# Patient Record
Sex: MALE | Race: WHITE | Employment: OTHER | URBAN - METROPOLITAN AREA
[De-identification: names, ages, dates, MRNs, and addresses within clinical notes are randomized per-mention and may not be internally consistent; named-entity substitution may affect disease eponyms.]

---

## 2019-03-13 ENCOUNTER — HOSPITAL ENCOUNTER (OUTPATIENT)
Dept: SLEEP CENTER | Age: 69
Discharge: HOME | End: 2019-03-13
Payer: MEDICARE

## 2019-03-13 DIAGNOSIS — G47.33 OSA (OBSTRUCTIVE SLEEP APNEA): Primary | ICD-10-CM

## 2019-04-15 ENCOUNTER — HOSPITAL ENCOUNTER (OUTPATIENT)
Dept: SLEEP CENTER | Age: 69
Discharge: HOME | End: 2019-04-15
Payer: MEDICARE

## 2019-04-15 PROCEDURE — 95806 SLEEP STUDY UNATT&RESP EFFT: CPT

## 2019-04-19 PROCEDURE — 95806 SLEEP STUDY UNATT&RESP EFFT: CPT | Performed by: INTERNAL MEDICINE

## 2021-06-25 ENCOUNTER — HOSPITAL ENCOUNTER (EMERGENCY)
Age: 71
Discharge: HOME | End: 2021-06-25
Payer: MEDICARE

## 2021-06-25 ENCOUNTER — APPOINTMENT (OUTPATIENT)
Dept: GENERAL RADIOLOGY | Age: 71
End: 2021-06-25
Payer: MEDICARE

## 2021-06-25 VITALS
RESPIRATION RATE: 20 BRPM | BODY MASS INDEX: 35.34 KG/M2 | SYSTOLIC BLOOD PRESSURE: 134 MMHG | DIASTOLIC BLOOD PRESSURE: 87 MMHG | HEART RATE: 67 BPM | TEMPERATURE: 97.1 F | WEIGHT: 180 LBS | OXYGEN SATURATION: 98 % | HEIGHT: 60 IN

## 2021-06-25 DIAGNOSIS — M54.32 SCIATICA OF LEFT SIDE: Primary | ICD-10-CM

## 2021-06-25 DIAGNOSIS — M25.552 LEFT HIP PAIN: ICD-10-CM

## 2021-06-25 PROCEDURE — 73502 X-RAY EXAM HIP UNI 2-3 VIEWS: CPT

## 2021-06-25 PROCEDURE — 99211 OFF/OP EST MAY X REQ PHY/QHP: CPT

## 2021-06-25 ASSESSMENT — PAIN SCALES - GENERAL: PAINLEVEL_OUTOF10: 10

## 2021-06-25 ASSESSMENT — PAIN DESCRIPTION - PAIN TYPE: TYPE: ACUTE PAIN

## 2021-06-25 ASSESSMENT — PAIN DESCRIPTION - ORIENTATION: ORIENTATION: LEFT

## 2021-06-25 ASSESSMENT — PAIN DESCRIPTION - LOCATION: LOCATION: HIP

## 2021-06-25 NOTE — ED PROVIDER NOTES
Department of Emergency Medicine   [facility]  Provider Note  Admit Date/RoomTime: 2021  6:24 PM  Room:   NAME: Lu Manzanares  : 1950  MRN: [id number]     Chief Complaint:  Hip Pain (has had left hip pain  for   some time  pain got worse  today     has had hip replacement   )    History of Present Illness       Lu Manzanares is a 73 y.o. old male who has a past medical history of:   Past Medical History:   Diagnosis Date    Hearing loss     slight    History of duodenal ulcer age 23    Lactose intolerance     Metabolic bone disease     Dr Gillian Garcia yearly; pt states sees Dr Gillian Garcia for osteopenia    Nausea & vomiting     Osteoarthritis     Osteoporosis     Preoperative clearance 3/2014    medical-Dr. Gus García; paper copy on chart    Sleep apnea     does not use cpap    Thyroid disease     Wears glasses     presents to the urgent care center by private vehicle, for evaluation of left hip pain. He said he has had problems with joint pain for years has had a hip replacement in the left he said he has had problems with pain in the left hip before I said today was sitting on a stool in his garage working on a Rototiller and he said when he went to get up he had sudden severe excruciating pain in the left hip radiating down to the knee. He denies any back pain. But states he has had problems with his back in the past also. He said this happened about 3 hours ago and since he has been waiting to be seen he said actually the pain is almost gone but he still concerned there could be a problem. ROS    Pertinent positives and negatives are stated within HPI, all other systems reviewed and are negative. Past Surgical History:   Procedure Laterality Date    ENDOSCOPY, COLON, DIAGNOSTIC      HIP ARTHROPLASTY Left 2006    [facility]    KNEE ARTHROPLASTY Bilateral 2014    Bilateral TKA  JOHNNIE Coelho, [facility] Left     Left knee meniscectomy [facility]    ROTATOR CUFF REPAIR     Social History:  reports that he has never smoked. He has never used smokeless tobacco. He reports current alcohol use. He reports that he does not use drugs. Family History: family history is not on file. Allergies: Other, Darvocet a500 [propoxyphene n-acetaminophen], and Percocet [oxycodone-acetaminophen]    Physical Exam           ED Triage Vitals [06/25/21 1826]   BP Temp Temp Source Pulse Resp SpO2 Height Weight   134/87 97.1 °F (36.2 °C) Infrared 67 20 98 % 5' (1.524 m) 180 lb (81.6 kg)      Oxygen Saturation Interpretation: Normal.    · Constitutional:  Alert, development consistent with age. · HEENT:  NC/NT. Airway patent. · Neck:  Normal ROM. Supple. · Extremity(s):  Left: hip. No edema noted in the leg he can ambulate without difficulty he can get up and down off the exam table without difficulty. Full range of motion of the knee without problems. ·             Gait:  normal.  · Lymphatics: No lymphangitis or adenopathy noted. · Neurological:  Oriented x3. Motor functions intact. Lab / Imaging Results   (All laboratory and radiology results have been personally reviewed by myself)  Labs:  No results found for this visit on 06/25/21. Imaging: All Radiology results interpreted by Radiologist unless otherwise noted. XR HIP LEFT (2-3 VIEWS)   Final Result   No evidence of acute fracture or dislocation of the left hip. Left hip prosthesis in anatomic orientation. ED Course / Medical Decision Making   Medications - No data to display     Consults:   None      MDM:      X-ray of the hip was obtained and it was negative. The left hip prosthesis is in anatomic alignment there is no fracture or dislocation.   Discussed the result with him I did advise him that I can order him something for pain and inflammation this could be a sciatica problem since he has had that  in the past ---patient states he does not want to wait any

## 2023-02-01 ENCOUNTER — APPOINTMENT (OUTPATIENT)
Dept: GENERAL RADIOLOGY | Age: 73
End: 2023-02-01
Payer: MEDICARE

## 2023-02-01 ENCOUNTER — HOSPITAL ENCOUNTER (OUTPATIENT)
Age: 73
Setting detail: OBSERVATION
Discharge: HOME | End: 2023-02-03
Attending: EMERGENCY MEDICINE | Admitting: INTERNAL MEDICINE
Payer: MEDICARE

## 2023-02-01 DIAGNOSIS — I49.8 BIGEMINY: ICD-10-CM

## 2023-02-01 DIAGNOSIS — I49.9 VENTRICULAR ARRHYTHMIA: ICD-10-CM

## 2023-02-01 DIAGNOSIS — R07.9 CHEST PAIN, UNSPECIFIED TYPE: Primary | ICD-10-CM

## 2023-02-01 LAB
ALBUMIN SERPL-MCNC: 3.9 G/DL (ref 3.5–5.2)
ALP BLD-CCNC: 61 U/L (ref 40–129)
ALT SERPL-CCNC: 19 U/L (ref 0–40)
ANION GAP SERPL CALCULATED.3IONS-SCNC: 11 MMOL/L (ref 7–16)
AST SERPL-CCNC: 20 U/L (ref 0–39)
BASOPHILS ABSOLUTE: 0.05 E9/L (ref 0–0.2)
BASOPHILS RELATIVE PERCENT: 0.9 % (ref 0–2)
BILIRUB SERPL-MCNC: 0.4 MG/DL (ref 0–1.2)
BUN BLDV-MCNC: 29 MG/DL (ref 6–23)
CALCIUM SERPL-MCNC: 8.9 MG/DL (ref 8.6–10.2)
CHLORIDE BLD-SCNC: 104 MMOL/L (ref 98–107)
CO2: 27 MMOL/L (ref 22–29)
CREAT SERPL-MCNC: 1 MG/DL (ref 0.7–1.2)
D DIMER: 200 NG/ML DDU
EOSINOPHILS ABSOLUTE: 0.12 E9/L (ref 0.05–0.5)
EOSINOPHILS RELATIVE PERCENT: 2.1 % (ref 0–6)
GFR SERPL CREATININE-BSD FRML MDRD: >60 ML/MIN/1.73
GLUCOSE BLD-MCNC: 130 MG/DL (ref 74–99)
HCT VFR BLD CALC: 46.4 % (ref 37–54)
HEMOGLOBIN: 16 G/DL (ref 12.5–16.5)
IMMATURE GRANULOCYTES #: 0.02 E9/L
IMMATURE GRANULOCYTES %: 0.4 % (ref 0–5)
LYMPHOCYTES ABSOLUTE: 1.28 E9/L (ref 1.5–4)
LYMPHOCYTES RELATIVE PERCENT: 22.4 % (ref 20–42)
MAGNESIUM: 2 MG/DL (ref 1.6–2.6)
MCH RBC QN AUTO: 30.7 PG (ref 26–35)
MCHC RBC AUTO-ENTMCNC: 34.5 % (ref 32–34.5)
MCV RBC AUTO: 89.1 FL (ref 80–99.9)
MONOCYTES ABSOLUTE: 0.49 E9/L (ref 0.1–0.95)
MONOCYTES RELATIVE PERCENT: 8.6 % (ref 2–12)
NEUTROPHILS ABSOLUTE: 3.75 E9/L (ref 1.8–7.3)
NEUTROPHILS RELATIVE PERCENT: 65.6 % (ref 43–80)
PDW BLD-RTO: 12.9 FL (ref 11.5–15)
PLATELET # BLD: 146 E9/L (ref 130–450)
PMV BLD AUTO: 10.9 FL (ref 7–12)
POTASSIUM SERPL-SCNC: 3.9 MMOL/L (ref 3.5–5)
RBC # BLD: 5.21 E12/L (ref 3.8–5.8)
SODIUM BLD-SCNC: 142 MMOL/L (ref 132–146)
TOTAL PROTEIN: 6.1 G/DL (ref 6.4–8.3)
TROPONIN, HIGH SENSITIVITY: 16 NG/L (ref 0–11)
TROPONIN, HIGH SENSITIVITY: 16 NG/L (ref 0–11)
WBC # BLD: 5.7 E9/L (ref 4.5–11.5)

## 2023-02-01 PROCEDURE — 99285 EMERGENCY DEPT VISIT HI MDM: CPT

## 2023-02-01 PROCEDURE — 85378 FIBRIN DEGRADE SEMIQUANT: CPT

## 2023-02-01 PROCEDURE — 36415 COLL VENOUS BLD VENIPUNCTURE: CPT

## 2023-02-01 PROCEDURE — 71045 X-RAY EXAM CHEST 1 VIEW: CPT

## 2023-02-01 PROCEDURE — 6370000000 HC RX 637 (ALT 250 FOR IP): Performed by: EMERGENCY MEDICINE

## 2023-02-01 PROCEDURE — 84484 ASSAY OF TROPONIN QUANT: CPT

## 2023-02-01 PROCEDURE — 93005 ELECTROCARDIOGRAM TRACING: CPT | Performed by: EMERGENCY MEDICINE

## 2023-02-01 PROCEDURE — 85025 COMPLETE CBC W/AUTO DIFF WBC: CPT

## 2023-02-01 PROCEDURE — 80053 COMPREHEN METABOLIC PANEL: CPT

## 2023-02-01 PROCEDURE — 83735 ASSAY OF MAGNESIUM: CPT

## 2023-02-01 RX ORDER — TAMSULOSIN HYDROCHLORIDE 0.4 MG/1
0.4 CAPSULE ORAL DAILY
COMMUNITY

## 2023-02-01 RX ORDER — ASPIRIN 81 MG/1
324 TABLET, CHEWABLE ORAL ONCE
Status: COMPLETED | OUTPATIENT
Start: 2023-02-01 | End: 2023-02-01

## 2023-02-01 RX ORDER — NITROGLYCERIN 0.4 MG/1
0.4 TABLET SUBLINGUAL EVERY 5 MIN PRN
Status: DISCONTINUED | OUTPATIENT
Start: 2023-02-01 | End: 2023-02-03 | Stop reason: HOSPADM

## 2023-02-01 RX ADMIN — ASPIRIN 81 MG CHEWABLE TABLET 324 MG: 81 TABLET CHEWABLE at 17:49

## 2023-02-01 RX ADMIN — NITROGLYCERIN 0.4 MG: 0.4 TABLET, ORALLY DISINTEGRATING SUBLINGUAL at 17:48

## 2023-02-01 ASSESSMENT — PAIN SCALES - GENERAL
PAINLEVEL_OUTOF10: 5
PAINLEVEL_OUTOF10: 3

## 2023-02-01 ASSESSMENT — PAIN DESCRIPTION - LOCATION
LOCATION: CHEST
LOCATION: CHEST;BACK

## 2023-02-01 ASSESSMENT — PAIN DESCRIPTION - DESCRIPTORS
DESCRIPTORS: ACHING;SORE
DESCRIPTORS: ACHING

## 2023-02-01 ASSESSMENT — PAIN - FUNCTIONAL ASSESSMENT: PAIN_FUNCTIONAL_ASSESSMENT: 0-10

## 2023-02-01 ASSESSMENT — PAIN DESCRIPTION - PAIN TYPE: TYPE: ACUTE PAIN

## 2023-02-01 NOTE — ED PROVIDER NOTES
[facility]        Pt Name: Kenisha Fulton  MRN: [id number]  [place] 1950  Date of evaluation: 2/1/2023  Provider: Gilbert Polk DO  PCP: Barb Hernandez DO  Note Started: 5:09 PM EST 2/1/23    CHIEF COMPLAINT       Chief Complaint   Patient presents with    Chest Pain     Pt with mid and left chest pain with simultaneous pain in back after returning home from walking two 15 minute miles this morning       HISTORY OF PRESENT ILLNESS: 1 or more Elements   History From: patient     Limitations to history : None    Kenisha Fulton is a 81 y.o. male who presents with substernal chest pain radiating to his left shoulder and left upper back beginning around 12noon today after walking 2 miles (daily routine exercise). Describes it as an ache and has been constant since 12:00. He has a past medical history significant for some kind of cardiac arrhythmia and had a normal stress test in August.  He also has a past medical history of DVT on Eliquis once a day. He states he has a history of diabetes that is controlled with diet and exercise and his last hemoglobin A1c was in the 6s. The complaint has been constant, moderate in severity, and worsened by nothing. Patient denies fever/chills, sore throat, cough, congestion,  shortness of breath, edema, headache, visual disturbances, focal paresthesias, focal weakness, abdominal pain, nausea, vomiting, diarrhea, constipation, dysuria, hematuria, trauma, neck or back pain, rash or other complaints. Nursing Notes were all reviewed and agreed with or any disagreements were addressed in the HPI. REVIEW OF SYSTEMS :           Positives and Pertinent negatives as per HPI.      SURGICAL HISTORY     Past Surgical History:   Procedure Laterality Date    ENDOSCOPY, COLON, DIAGNOSTIC      HIP ARTHROPLASTY Left 01/2006    [facility]    KNEE ARTHROPLASTY Bilateral 05/12/2014    Bilateral TKA  JOHNNIE Loyd MD    KNEE SURGERY Left 1986    Left knee meniscectomy [facility]    ROTATOR CUFF REPAIR         CURRENTMEDICATIONS       Previous Medications    ACETAMINOPHEN 650 MG TABS    Take 650 mg by mouth every 4 hours as needed. B COMPLEX VITAMINS CAPSULE    Take 1 capsule by mouth daily. CALCIUM CARBONATE (TUMS) 500 MG CHEWABLE TABLET    Take 1 tablet by mouth daily    LACTASE (LACTAID PO)    Take  by mouth as needed. MELOXICAM (MOBIC) 15 MG TABLET    Take 15 mg by mouth Daily with supper. METFORMIN HCL PO    Take 500 mg by mouth daily    RIVAROXABAN (XARELTO PO)    Take 20 mg by mouth daily    TAMSULOSIN (FLOMAX) 0.4 MG CAPSULE    Take 0.4 mg by mouth daily    THYROID (ARMOUR) 30 MG TABLET    Take 30 mg by mouth daily. VITAMIN B-12 (CYANOCOBALAMIN) 100 MCG TABLET    Take 50 mcg by mouth daily    VITAMIN D (CHOLECALCIFEROL) 1000 UNIT TABS TABLET    Take 2,000 Units by mouth daily       ALLERGIES     Other, Darvocet a500 [propoxyphene n-acetaminophen], and Percocet [oxycodone-acetaminophen]    FAMILYHISTORY     History reviewed. No pertinent family history.      SOCIAL HISTORY       Social History     Tobacco Use    Smoking status: Never    Smokeless tobacco: Never   Substance Use Topics    Alcohol use: Yes     Comment: wine 1-2 per week    Drug use: No       SCREENINGS        Maurice Coma Scale  Eye Opening: Spontaneous  Best Verbal Response: Oriented  Best Motor Response: Obeys commands  [place] Coma Scale Score: 15                CIWA Assessment  BP: 134/70  Heart Rate: (!) 48           PHYSICAL EXAM  1 or more Elements     ED Triage Vitals   BP Temp Temp Source Heart Rate Resp SpO2 Height Weight   02/01/23 1633 02/01/23 1633 02/01/23 1633 02/01/23 1633 02/01/23 1633 02/01/23 1633 02/01/23 1700 02/01/23 1655   134/70 97.6 °F (36.4 °C) Oral (!) 48 14 97 % 5' (1.524 m) 178 lb (80.7 kg)            ----------------------------------------PHYSICAL EXAM--------------------------------------  Constitutional:  Well developed, well nourished, no acute distress, non-toxic appearance   Eyes:  PERRL, conjunctiva normal, EOMI  HENT:  Atraumatic, external ears normal, nose normal, oropharynx moist,. Neck- normal range of motion, no nuchal rigidity   Respiratory:  No respiratory distress, normal breath sounds, no rales, no wheezing   Cardiovascular:  Normal rate, normal rhythm, no murmurs, no gallops, no rubs. Radial and DP pulses 2+ bilaterally. Compartments are soft. He is warm and well perfused. Cap refill less than 3 seconds. Chest pain is not reproducible. GI:  Soft, nondistended, normal bowel sounds, nontender, no organomegaly, no mass, no rebound, no guarding   :  No costovertebral angle tenderness   Musculoskeletal:  No edema, no tenderness, no deformities. Back- no tenderness  Integument:  Well hydrated, no rash. Adequate perfusion. Lymphatic:  No cervical lymphadenopathy noted   Neurologic:  Alert & oriented x 3, CN 2-12 normal, no focal deficits noted. . Normal gait.     Psychiatric:  Speech and behavior appropriate             DIAGNOSTIC RESULTS   LABS:  Results for orders placed or performed during the hospital encounter of 02/01/23   CBC with Auto Differential   Result Value Ref Range    WBC 5.7 4.5 - 11.5 E9/L    RBC 5.21 3.80 - 5.80 E12/L    Hemoglobin 16.0 12.5 - 16.5 g/dL    Hematocrit 46.4 37.0 - 54.0 %    MCV 89.1 80.0 - 99.9 fL    MCH 30.7 26.0 - 35.0 pg    MCHC 34.5 32.0 - 34.5 %    RDW 12.9 11.5 - 15.0 fL    Platelets 078 257 - 023 E9/L    MPV 10.9 7.0 - 12.0 fL    Neutrophils % 65.6 43.0 - 80.0 %    Immature Granulocytes % 0.4 0.0 - 5.0 %    Lymphocytes % 22.4 20.0 - 42.0 %    Monocytes % 8.6 2.0 - 12.0 %    Eosinophils % 2.1 0.0 - 6.0 %    Basophils % 0.9 0.0 - 2.0 %    Neutrophils Absolute 3.75 1.80 - 7.30 E9/L    Immature Granulocytes # 0.02 E9/L    Lymphocytes Absolute 1.28 (L) 1.50 - 4.00 E9/L    Monocytes Absolute 0.49 0.10 - 0.95 E9/L    Eosinophils Absolute 0.12 0.05 - 0.50 E9/L    Basophils Absolute 0.05 0.00 - 0.20 E9/L   Troponin   Result Value Ref Range    Troponin, High Sensitivity 16 (H) 0 - 11 ng/L   D-Dimer, Quantitative   Result Value Ref Range    D-Dimer, Quant 200 ng/mL DDU       As interpreted by me, the above displayed labs are abnormal. All other labs obtained during this visit were within normal range or not returned as of this dictation. EKG Interpretation  Interpreted by emergency department physicianBrian,   Time: 16:44 PM EDT  Rhythm:  sinsu rhythm with frequent PVCs in a pattern of bigeminy  Rate: 92  Axis: normal  Conduction: normal  ST Segments: no acute change  T Waves: no acute change  Clinical Impression: bigeminy  Comparison to prior EKG: no previous EKG        RADIOLOGY:   Non-plain film images such as CT, Ultrasound and MRI are read by the radiologist. Plain radiographic images are visualized and preliminarily interpreted by the ED Provider with the below findings:    CXR 1 view: clear lungs    Interpretation per the Radiologist below, if available at the time of this note:    XR CHEST PORTABLE    (Results Pending)     No results found. No results found. PROCEDURES   Unless otherwise noted below, none          CRITICAL CARE TIME (.cct)       PAST MEDICAL HISTORY/Chronic Conditions Affecting Care      has a past medical history of Hearing loss, History of duodenal ulcer (age 23), Lactose intolerance, Metabolic bone disease, Nausea & vomiting, Osteoarthritis, Osteoporosis, Preoperative clearance (3/2014), Sleep apnea, Thyroid disease, and Wears glasses.      EMERGENCY DEPARTMENT COURSE    Vitals:    Vitals:    02/01/23 1633 02/01/23 1655 02/01/23 1700   BP: 134/70     Pulse: (!) 48     Resp: 14     Temp: 97.6 °F (36.4 °C)     TempSrc: Oral     SpO2: 97%     Weight:  178 lb (80.7 kg) 178 lb (80.7 kg)   Height:   5' (1.524 m)       Patient was given the following medications:  Medications nitroGLYCERIN (NITROSTAT) SL tablet 0.4 mg (0.4 mg SubLINGual Given 2/1/23 1748)   aspirin chewable tablet 324 mg (324 mg Oral Given 2/1/23 1749)         Medical Decision Making/Differential Diagnosis:    CC/HPI Summary, Social Determinants of health, Records Reviewed, DDx, testing done/not done, ED Course, Reassessment, disposition considerations/shared decision making with patient, consults, disposition:            MDM:   Patient presents with left-sided substernal chest pain described as aching, radiating to his left shoulder and left posterior upper back. It has been constant since his exercise routine today. He did not have any pain during his exercise. His EKG is consistent with bigeminy. On the cardiac monitor he goes in and out of bigeminy versus normal sinus rhythm. He still has pain upon presentation to the ER so he was provided aspirin and nitroglycerin sublingual.  At this time patient care is turned over to Dr. Russ Linn pending laboratory work, possible CTA to rule out PE and admission to [facility] for cardiac stress test.  Differential diagnosis includes: Neck ischemia, NSTEMI, STEMI, PE   --------------------------------- IMPRESSION AND DISPOSITION ---------------------------------    IMPRESSION  1. Chest pain, unspecified type    2. Bigeminy        DISPOSITION  Disposition: Admit to telemetry  Patient condition is stable    PATIENT REFERRED TO:  No follow-up provider specified. DISCHARGE MEDICATIONS:  New Prescriptions    No medications on file       DISCONTINUED MEDICATIONS:  Discontinued Medications    ASCORBIC ACID (VITAMIN C) 250 MG TABLET    Take 250 mg by mouth daily    ASPIRIN 81 MG TABLET    Take 81 mg by mouth daily. Last dose 05/02. BISACODYL (DULCOLAX) 10 MG SUPPOSITORY    Place 1 suppository rectally daily as needed for Constipation. DOCUSATE SODIUM (COLACE, DULCOLAX) 100 MG CAPS    Take 100 mg by mouth daily.     MULTIPLE VITAMINS-MINERALS (MULTIVITAMIN PO)    Take by mouth daily. OMEGA-3 FATTY ACIDS (FISH OIL) 1000 MG CPDR    Take  by mouth daily. Last dose 05/02. PSYLLIUM (METAMUCIL PO)    Take 1 capsule by mouth daily.     RED YEAST RICE 600 MG CAPS    Take by mouth    VITAMIN E 1000 UNITS CAPSULE    Take 1,000 Units by mouth daily    ZINC 100 MG TABS    Take by mouth              (Please note that portions of this note were completed with a voice recognition program.  Efforts were made to edit the dictations but occasionally words are mis-transcribed.)    Cornelius Saucedo DO (electronically signed)            Cornelius Saucedo DO  02/01/23 [id number]

## 2023-02-02 PROBLEM — R07.9 CHEST PAIN: Status: ACTIVE | Noted: 2023-02-02

## 2023-02-02 PROBLEM — I49.9 VENTRICULAR ARRHYTHMIA: Status: ACTIVE | Noted: 2023-02-02

## 2023-02-02 LAB
EKG ATRIAL RATE: 92 BPM
EKG P AXIS: 43 DEGREES
EKG P-R INTERVAL: 148 MS
EKG Q-T INTERVAL: 348 MS
EKG QRS DURATION: 72 MS
EKG QTC CALCULATION (BAZETT): 430 MS
EKG R AXIS: 23 DEGREES
EKG T AXIS: 37 DEGREES
EKG VENTRICULAR RATE: 92 BPM
LV EF: 70 %
LVEF MODALITY: NORMAL
TROPONIN, HIGH SENSITIVITY: 20 NG/L (ref 0–11)

## 2023-02-02 PROCEDURE — 6370000000 HC RX 637 (ALT 250 FOR IP): Performed by: STUDENT IN AN ORGANIZED HEALTH CARE EDUCATION/TRAINING PROGRAM

## 2023-02-02 PROCEDURE — APPSS60 APP SPLIT SHARED TIME 46-60 MINUTES

## 2023-02-02 PROCEDURE — 6370000000 HC RX 637 (ALT 250 FOR IP)

## 2023-02-02 PROCEDURE — 99253 IP/OBS CNSLTJ NEW/EST LOW 45: CPT | Performed by: INTERNAL MEDICINE

## 2023-02-02 PROCEDURE — G0378 HOSPITAL OBSERVATION PER HR: HCPCS

## 2023-02-02 PROCEDURE — 84484 ASSAY OF TROPONIN QUANT: CPT

## 2023-02-02 PROCEDURE — 93010 ELECTROCARDIOGRAM REPORT: CPT | Performed by: INTERNAL MEDICINE

## 2023-02-02 PROCEDURE — 99221 1ST HOSP IP/OBS SF/LOW 40: CPT | Performed by: INTERNAL MEDICINE

## 2023-02-02 PROCEDURE — 36415 COLL VENOUS BLD VENIPUNCTURE: CPT

## 2023-02-02 PROCEDURE — 93306 TTE W/DOPPLER COMPLETE: CPT

## 2023-02-02 RX ORDER — METOPROLOL SUCCINATE 25 MG/1
25 TABLET, EXTENDED RELEASE ORAL 2 TIMES DAILY
Status: DISCONTINUED | OUTPATIENT
Start: 2023-02-02 | End: 2023-02-03 | Stop reason: HOSPADM

## 2023-02-02 RX ORDER — TAMSULOSIN HYDROCHLORIDE 0.4 MG/1
0.4 CAPSULE ORAL DAILY
Status: DISCONTINUED | OUTPATIENT
Start: 2023-02-02 | End: 2023-02-03 | Stop reason: HOSPADM

## 2023-02-02 RX ORDER — ASPIRIN 81 MG/1
81 TABLET, CHEWABLE ORAL DAILY
Status: DISCONTINUED | OUTPATIENT
Start: 2023-02-03 | End: 2023-02-03 | Stop reason: HOSPADM

## 2023-02-02 RX ORDER — ASPIRIN 81 MG/1
81 TABLET, CHEWABLE ORAL DAILY
Status: DISCONTINUED | OUTPATIENT
Start: 2023-02-02 | End: 2023-02-02

## 2023-02-02 RX ORDER — LEVOTHYROXINE AND LIOTHYRONINE 19; 4.5 UG/1; UG/1
30 TABLET ORAL DAILY
Status: DISCONTINUED | OUTPATIENT
Start: 2023-02-02 | End: 2023-02-03 | Stop reason: HOSPADM

## 2023-02-02 RX ORDER — METOPROLOL SUCCINATE 50 MG/1
50 TABLET, EXTENDED RELEASE ORAL ONCE
Status: COMPLETED | OUTPATIENT
Start: 2023-02-02 | End: 2023-02-02

## 2023-02-02 RX ADMIN — METOPROLOL SUCCINATE 50 MG: 50 TABLET, EXTENDED RELEASE ORAL at 14:00

## 2023-02-02 RX ADMIN — LEVOTHYROXINE, LIOTHYRONINE 30 MG: 19; 4.5 TABLET ORAL at 10:30

## 2023-02-02 RX ADMIN — METOPROLOL SUCCINATE 25 MG: 25 TABLET, EXTENDED RELEASE ORAL at 20:50

## 2023-02-02 RX ADMIN — RIVAROXABAN 20 MG: 20 TABLET, FILM COATED ORAL at 17:53

## 2023-02-02 RX ADMIN — TAMSULOSIN HYDROCHLORIDE 0.4 MG: 0.4 CAPSULE ORAL at 10:31

## 2023-02-02 RX ADMIN — METFORMIN HYDROCHLORIDE 500 MG: 500 TABLET ORAL at 10:30

## 2023-02-02 ASSESSMENT — PAIN SCALES - GENERAL
PAINLEVEL_OUTOF10: 0

## 2023-02-02 ASSESSMENT — LIFESTYLE VARIABLES: HOW OFTEN DO YOU HAVE A DRINK CONTAINING ALCOHOL: NEVER

## 2023-02-02 NOTE — ED NOTES
[facility] to [employer].  [facility] ambulance here now for transport     Roma Keyes RN  02/02/23 0631

## 2023-02-02 NOTE — ED PROVIDER NOTES
6:21 PM EST  I received this patient at sign out from Dr. De La Torre. I have discussed the patient's initial exam, treatment and plan of care with the out going physician. I have introduced my self to the patient / family and have answered their questions to this point. I have examined the patient myself and reviewed ordered tests / medications and  reviewed any available results to this point. See Dr. Ani Man notes RE: HPI/ROS/family history/review of systems and EKG interpretation, which I did review. --------------------------------------------- PAST HISTORY ---------------------------------------------  Past Medical History:  has a past medical history of Hearing loss, History of duodenal ulcer, Lactose intolerance, Metabolic bone disease, Nausea & vomiting, Osteoarthritis, Osteoporosis, Preoperative clearance, Sleep apnea, Thyroid disease, and Wears glasses. Past Surgical History:  has a past surgical history that includes Hip Arthroplasty (Left, 01/2006); knee surgery (Left, 1986); Endoscopy, colon, diagnostic; Knee Arthroplasty (Bilateral, 05/12/2014); and Rotator cuff repair. Social History:  reports that he has never smoked. He has never used smokeless tobacco. He reports current alcohol use. He reports that he does not use drugs. Family History: family history is not on file. The patients home medications have been reviewed. Allergies:  Other, Darvocet a500 [propoxyphene n-acetaminophen], and Percocet [oxycodone-acetaminophen]    -------------------------------------------------- RESULTS -------------------------------------------------  All laboratory and radiology results have been personally reviewed by myself   LABS:  Results for orders placed or performed during the hospital encounter of 02/01/23   CBC with Auto Differential   Result Value Ref Range    WBC 5.7 4.5 - 11.5 E9/L    RBC 5.21 3.80 - 5.80 E12/L    Hemoglobin 16.0 12.5 - 16.5 g/dL    Hematocrit 46.4 37.0 - 54.0 %    MCV 89.1 80.0 - 99.9 fL    MCH 30.7 26.0 - 35.0 pg    MCHC 34.5 32.0 - 34.5 %    RDW 12.9 11.5 - 15.0 fL    Platelets 484 180 - 489 E9/L    MPV 10.9 7.0 - 12.0 fL    Neutrophils % 65.6 43.0 - 80.0 %    Immature Granulocytes % 0.4 0.0 - 5.0 %    Lymphocytes % 22.4 20.0 - 42.0 %    Monocytes % 8.6 2.0 - 12.0 %    Eosinophils % 2.1 0.0 - 6.0 %    Basophils % 0.9 0.0 - 2.0 %    Neutrophils Absolute 3.75 1.80 - 7.30 E9/L    Immature Granulocytes # 0.02 E9/L    Lymphocytes Absolute 1.28 (L) 1.50 - 4.00 E9/L    Monocytes Absolute 0.49 0.10 - 0.95 E9/L    Eosinophils Absolute 0.12 0.05 - 0.50 E9/L    Basophils Absolute 0.05 0.00 - 0.20 E9/L   Comprehensive Metabolic Panel   Result Value Ref Range    Sodium 142 132 - 146 mmol/L    Potassium 3.9 3.5 - 5.0 mmol/L    Chloride 104 98 - 107 mmol/L    CO2 27 22 - 29 mmol/L    Anion Gap 11 7 - 16 mmol/L    Glucose 130 (H) 74 - 99 mg/dL    BUN 29 (H) 6 - 23 mg/dL    Creatinine 1.0 0.7 - 1.2 mg/dL    Est, Glom Filt Rate >60 >=60 mL/min/1.73    Calcium 8.9 8.6 - 10.2 mg/dL    Total Protein 6.1 (L) 6.4 - 8.3 g/dL    Albumin 3.9 3.5 - 5.2 g/dL    Total Bilirubin 0.4 0.0 - 1.2 mg/dL    Alkaline Phosphatase 61 40 - 129 U/L    ALT 19 0 - 40 U/L    AST 20 0 - 39 U/L   Magnesium   Result Value Ref Range    Magnesium 2.0 1.6 - 2.6 mg/dL   Troponin   Result Value Ref Range    Troponin, High Sensitivity 16 (H) 0 - 11 ng/L   D-Dimer, Quantitative   Result Value Ref Range    D-Dimer, Quant 200 ng/mL DDU   Troponin   Result Value Ref Range    Troponin, High Sensitivity 16 (H) 0 - 11 ng/L       RADIOLOGY:  Interpreted by Radiologist.  XR CHEST PORTABLE   Final Result   No acute process. ------------------------- NURSING NOTES AND VITALS REVIEWED ---------------------------      The nursing notes within the ED encounter and vital signs as below have been reviewed.    BP (!) 142/89   Pulse 84   Temp 97.6 °F (36.4 °C) (Oral)   Resp 16   Ht 5' (1.524 m)   Wt 178 lb (80.7 kg)   SpO2 98% BMI 34.76 kg/m²  Oxygen Saturation Interpretation: Normal    ---------------------------------------------------PHYSICAL EXAM--------------------------------------    Constitutional/General: Alert and oriented x3, well appearing, non toxic in NAD at the time of my exam  Head: Normocephalic and atraumatic  Eyes: PERRL, EOMI, no scleral injection, no scleral icterus  Mouth: Oropharynx clear, handling secretions, no trismus  Neck: Supple, full ROM, no JVD, trachea midline  Pulmonary: Lungs clear to auscultation bilaterally, no wheezes, rales, or rhonchi. Not in respiratory distress  Cardiovascular:  Regular rate and rhythm, no murmurs, gallops, or rubs. 2+ distal pulses  Abdomen: Soft, non tender, non distended, no organomegaly no masses no guarding no rigidity normal bowel sounds  Extremities: Moves all extremities x 4. Warm and well perfused  Skin: warm and dry without rash; no petechia no purpura no target lesions no bullae  Neurologic: GCS 15, cranial nerves II through XII intact with no focal deficits. No meningeal signs. Psych: Normal Affect    ------------------------------ ED COURSE/MEDICAL DECISION MAKING----------------------  Medications   nitroGLYCERIN (NITROSTAT) SL tablet 0.4 mg (0.4 mg SubLINGual Given 2/1/23 1748)   aspirin chewable tablet 324 mg (324 mg Oral Given 2/1/23 1749)     ED COURSE:  EKG #1:  Interpreted by emergency department attending physician unless otherwise noted. 2/1/23  Time: 16:44  Rhythm: normal sinus   Rate: 90-95  Axis: normal  Conduction: nonspecific interventricular conduction block; PVC's w/ Bigeminy  ST Segments: nonspecific changes  T Waves: non specific changes    Clinical Impression: non-specific EKG, PVC's w/ Bigeminy  Comparison to Prior tracings: There are no previous tracings available for comparison.           The HEART Risk Score for Acute Coronary Syndrome  Age <46 years, 55-63, 65+  ?  2   > 2 Risk Factors for CAD?*, 1-2, 0  2   History: slight, moderate, highly suspicious  1   EKG: Normal, nonspecific repolarization changes, ST depression   1        Total HEART Score:  6 points, moderate risk score     *Risk factors as follows:                    - Hypertension         - Diabetes mellitus (Diet Controlled)  - Obesity (BMI > 34)    Predicts 6-week risk of major adverse cardiac event. Low Score (0-3 points), risk of MACE of 0.9-1.7%. Moderate Score (4-6 points), risk of MACE of 12-16.6%. High Score (7-10 points), risk of MACE of 50-65%. Medical Decision Making:  CC/HPI:  74-year-old male presents with complaint of chest pain which occurred shortly after he walked 2 miles on a treadmill  Patient was given the following medications during their ED Course  Nitroglycerin 0.4 mg sublingual, aspirin 324 mg p.o. Chronic Conditions Affecting Care:  Diet controlled prediabetes, no history of coronary artery disease no prior MI, no hypertension   Social Determinants Influencing Care:  Non-smoker no history of alcohol abuse, no drug use  Outside Records Reviewed: n/a  Differential Diagnoses Considered/Test Results/ED COURSE:  ACS/MI, pneumonia, pneumothorax, PE, chest wall strain, GERD, to name a few. Patient has a nondiagnostic EKG but with ventricular bigeminy. The patient's HEART score = 6 points, indicating moderate risk score for ACS. Patient has had previous cardiac stress test in the [facility], per his report. Patient prefers to be admitted to [facility] and see a cardiologist there for consultation. Patient has remained hemodynamically stable here in the department. Screening chest x-ray did not show any focal infiltrates, no evidence of pneumothorax. The patient's cardiac markers show normal delta troponin. Patient's Wells score for PE = 0 points, low risk score; his serum D-dimer was within the limits. Complete metabolic profile showed glucose of 130 with a BUN of 29 creatinine 1.0 normal calculated anion gap. CBC was within the limits. Screening chest x-ray showed no focal infiltrates, no mediastinal abnormalities to suggest aortic dissection and no evidence of cardiomegaly or CHF. Consults:  Hospitalist on call, Dr. Luis Fernando Beltran, reviewed the patient's chart and accepted the patient for admission to his service. Re-Assessment:  Re-examination:  2/1/23   7:22 PM EST        Vital Signs:   Vitals:    02/01/23 2100 02/01/23 2200 02/01/23 2300 02/01/23 2315   BP: 130/85 133/87 129/82 (!) 142/89   Pulse: 70 63 90 84   Resp: 16 18 16 16   Temp:       TempSrc:       SpO2: 98% 99% 98% 98%   Weight:       Height:         Card/Pulm:  Rhythm: normal rate. Heart Sounds: no murmurs, gallops, or rubs. clear to auscultation, no wheezes or rales, and unlabored breathing. Capillary Refill: normal.  Radial Pulse:  present 2+. Skin:  Dry. ADMIT vs D/C/ Shared Decision Making:  Admission to the hospital was felt prudent given the patient's HEART score and the patient's history of postexertional chest pain, it is felt prudent to admit the patient to the hospital.  Patient agrees with this plan of care. Counseling: The emergency provider has spoken with the patient and discussed todays results, in addition to providing specific details for the plan of care and counseling regarding the diagnosis and prognosis. Questions are answered at this time and they are agreeable with the plan.    --------------------------------- IMPRESSION AND DISPOSITION ---------------------------------    IMPRESSION  1. Chest pain, unspecified type    2. Bigeminy        DISPOSITION  Disposition: Admit to telemetry  Patient condition is stable      NOTE: This report was transcribed using voice recognition software.  Every effort was made to ensure accuracy; however, inadvertent computerized transcription errors may be present       Ann Hernandez MD  02/01/23 5697

## 2023-02-02 NOTE — ED NOTES
Patient was accepted at [facility] by Dr. Elvin Donohue (intermediate tele). Patient will be placed on bed board.      [place], [place]  84/02/23 5113

## 2023-02-02 NOTE — CARE COORDINATION
2/2, Patient admitted for substernal chest pain. SW went to meet with patient. Unable to meet with patient due to test being done on patient. Per previous admission patient lives in a ranch home with wife. DME owned is [employer] and an elevated commode. PCP is Dr. Lisa Borges and Pharmacy is [facility] in [place]. Patient has a hx with [place]. SW/CM will follow to see what further recommendations are made on patient.       ALEX Gao  [facility] Case Management  [phone] Attempted calling patient, unable to leave message.

## 2023-02-02 NOTE — CONSULTS
Inpatient Cardiology Consultation      Reason for Consult: Chest pain    Consulting Physician: Dr. Rene Jeffrey    Requesting Physician:  Ed Momin MD    Date of Consultation: 2/2/2023    HISTORY OF PRESENT ILLNESS:   Patient is a 74-year-old male who is not known to [facility] cardiology. Patient follows with Dr. David Beckman in [facility] for cardiology. PMHx: Hypertension, hypothyroidism, YENY with CPAP, DVT on Xarelto once daily, diabetes no medications    Normal stress August/2022? HPI:  Patient presented to [facility] ER 2/1/2023 at 4:32 PM for chest pain occurring shortly after he walked 2 miles on a treadmill. Chest pain is substernal with radiation to left shoulder and left upper back scribed as an ache that has been constant since noon prior to arrival.  EKG was consistent with bigeminy. Patient presented with pain to ER and was provided with aspirin and sublingual nitroglycerin. Patient with a heart score of 6. Patient transferred from [facility] to [facility] for chest pain with bigeminy. VS upon arrival 97.6, 14 respirations, 48 pulse, 134/70, 97% room air. Labs: Potassium 3.9, BUN 29, creatinine 1.0, magnesium 2.0, glucose 130, serum calcium 8.9, total protein 6.1, troponin 16> 16> 20, albumin 3.9, WBC 5.7, H&H 16/46.4, platelet 469, D-dimer 200  CXR: No acute process    Upon assessment today 2/2/2022 patient is sitting semi-King in hospital bed on room air. Patient is alert and oriented, speaking full sentences, is in no apparent distress at this time. Wife is at bedside. Patient reports yesterday after doing a 2 mile brisk walk he returned home. He reports an hour after his walk he developed left upper anterior chest pain with radiation to the left shoulder and left scapula. He reports he had no other accompanying symptoms with this pain. He reports the pain sustained throughout the day which made him concerned at that time he presented to [facility] ER.   While in ER the patient was given aspirin and nitroglycerin which he reports neither did anything for the pain. He reports the pain eventually went away spontaneously throughout the evening. Today he has no symptoms at this time. He reports when he did have the pain yesterday nothing made it worse or better. Physical assessment is benign and left shoulder was palpated and had patient demonstrate limited ROM which is normal for patient. No pain was elicited with movement or palpation. The patient does report he had a left shoulder replacement some years prior. Telemetry does reveal patient is in bigeminy in the 90s. He reports he has been told that he has an irregular heartbeat although he does not know further than that details. He reports he had a negative nuclear stress test 7/2022 by Dr. Francheska Oquendo in [facility]. He is on Xarelto for 2 unprovoked DVTs with negative hematology studies for hypercoagulability diseases. Patient does not smoke, drinks about 2 beers a week and uses no drugs. He reports he is compliant with medications and is very active at home. He has had no decline in function recently. Most recent VS 97 Fahrenheit, 13 respirations, 61 pulse, 121/90, 98% room air. Please note: past medical records were reviewed per electronic medical record (EMR) - see detailed reports under Past Medical/ Surgical History. Past Medical History:   Hypertension  Hypothyroidism  YENY compliant with CPAP  DVTs x2, unprovoked. On Xarelto. Per patient negative hematologic studies for hypercoagulability disease. Diabetes, diet controlled    Reports normal nuclear medicine stress test 7/2022 by Dr. Francheska Oquendo    Past Surgical History:    Past Surgical History:   Procedure Laterality Date    ENDOSCOPY, COLON, DIAGNOSTIC      HIP ARTHROPLASTY Left 01/2006    [facility]    KNEE ARTHROPLASTY Bilateral 05/12/2014    Bilateral TKA  JOHNNIE Montgomery MD    KNEE SURGERY Left 1986    Left knee meniscectomy [facility] [employer]         Medications Prior to admit:  Prior to Admission medications    Medication Sig Start Date End Date Taking? Authorizing Provider   tamsulosin (FLOMAX) 0.4 MG capsule Take 0.4 mg by mouth daily   Yes Historical Provider, MD   Rivaroxaban (XARELTO PO) Take 20 mg by mouth daily    Historical Provider, MD   METFORMIN HCL PO Take 500 mg by mouth daily    Historical Provider, MD   vitamin B-12 (CYANOCOBALAMIN) 100 MCG tablet Take 50 mcg by mouth daily    Historical Provider, MD   calcium carbonate (TUMS) 500 MG chewable tablet Take 1 tablet by mouth daily    Historical Provider, MD   acetaminophen 650 MG TABS Take 650 mg by mouth every 4 hours as needed. Patient not taking: Reported on 2/2/2023 5/13/14   Mis Timmons MD   thyroid (ARMOUR) 30 MG tablet Take 30 mg by mouth daily. Historical Provider, MD   Lactase (LACTAID PO) Take  by mouth as needed. Historical Provider, MD   b complex vitamins capsule Take 1 capsule by mouth daily. Historical Provider, MD   vitamin D (CHOLECALCIFEROL) 1000 UNIT TABS tablet Take 2,000 Units by mouth daily    Historical Provider, MD       Current Medications:    Current Facility-Administered Medications: [START ON 2/3/2023] aspirin chewable tablet 81 mg, 81 mg, Oral, Daily  rivaroxaban (XARELTO) tablet 20 mg, 20 mg, Oral, Daily  tamsulosin (FLOMAX) capsule 0.4 mg, 0.4 mg, Oral, Daily  thyroid (ARMOUR) tablet 30 mg, 30 mg, Oral, Daily  metFORMIN (GLUCOPHAGE) tablet 500 mg, 500 mg, Oral, Daily  nitroGLYCERIN (NITROSTAT) SL tablet 0.4 mg, 0.4 mg, SubLINGual, Q5 Min PRN    Allergies:   Other, Darvocet a500 [propoxyphene n-acetaminophen], and Percocet [oxycodone-acetaminophen]    Social History:    Social History     Socioeconomic History    Marital status:      Spouse name: Not on file    Number of children: 5    Years of education: 14    Highest education level: Not on file   Occupational History    Not on file   Tobacco Use    Smoking status: Never    Smokeless tobacco: Never   Vaping Use    Vaping Use: Never used   Substance and Sexual Activity    Alcohol use: Not Currently     Comment: wine 1-2 per week    Drug use: No    Sexual activity: Yes     Partners: Female   Other Topics Concern    Not on file   Social History Narrative    Not on file     Social Determinants of Health     Financial Resource Strain: Not on file   Food Insecurity: Not on file   Transportation Needs: Not on file   Physical Activity: Not on file   Stress: Not on file   Social Connections: Not on file   Intimate Partner Violence: Not on file   Housing Stability: Not on file       Family History:   Family History   Problem Relation Age of Onset    Arrhythmia Mother     Cancer Father          REVIEW OF SYSTEMS:     Constitutional: Denies fevers, chills, night sweats, and fatigue  HEENT: Denies headaches, nose bleeds, and blurred vision,oral pain, abscess or lesion. Musculoskeletal: Denies falls, pain to BLE with ambulation and edema to BLE. Neurological: Denies dizziness and lightheadedness, numbness and tingling  Cardiovascular: Denies palpitations, and feelings of heart racing. Resolved left upper anterior chest discomfort described as ache with radiation to left shoulder and left scapula  Respiratory: Denies orthopnea and PND  Gastrointestinal: Denies heartburn, nausea/vomiting, diarrhea and constipation, black/bloody, and tarry stools. Genitourinary: Denies dysuria and hematuria  Hematologic: Denies excessive bruising or bleeding  Lymphatic: Denies lumps and bumps to neck, axilla, breast, and groin  Endocrine: Denies excessive thirst. Denies intolerance to hot and cold  Psychiatric: Denies anxiety and depression. PHYSICAL EXAM:   BP (!) 121/90   Pulse 61   Temp 97 °F (36.1 °C) (Temporal)   Resp 13   Ht 5' 10\" (1.778 m)   Wt 170 lb 3 oz (77.2 kg)   SpO2 98%   BMI 24.42 kg/m²   CONST:  Well developed, well nourished 71-year-old  male who appears stated age. Awake, alert, cooperative, no apparent distress  HEENT:   Head- Normocephalic, atraumatic   Eyes- Conjunctivae pink, anicteric  Throat- Oral mucosa pink and moist  Neck-  No stridor, trachea midline, no jugular venous distention. No adenopathy   CHEST: Chest symmetrical and non-tender to palpation. No accessory muscle use or intercostal retractions  RESPIRATORY: Lung sounds - clear throughout fields   CARDIOVASCULAR:     No carotid bruit  Heart Inspection- shows no noted pulsations  Heart Palpation- no heaves or thrills; PMI is non-displaced   Heart Ausculation- Regular rate and rhythm, no murmur. No s3, s4 or rub   PV: No lower extremity edema. No varicosities. Pedal pulses palpable, no clubbing or cyanosis   ABDOMEN: Soft, non-tender to light palpation. Bowel sounds present. No palpable masses no organomegaly; no abdominal bruit  MS: Good muscle strength and tone. No atrophy or abnormal movements. : Deferred  SKIN: Warm and dry no statis dermatitis or ulcers   NEURO / PSYCH: Oriented to person, place and time. Speech clear and appropriate. Follows all commands. Pleasant affect     DATA:    ECG: Sinus rhythm with frequent premature ventricular complexes in a pattern of bigeminy. Vent rate 92, VA interval 148, QRS duration 72, QTc 430. Tele strips: Bigeminy, 90 heart rate  Diagnostic:    Labs:   CBC:   Recent Labs     02/01/23  1725   WBC 5.7   HGB 16.0   HCT 46.4        BMP:   Recent Labs     02/01/23  1725      K 3.9   CO2 27   BUN 29*   CREATININE 1.0   LABGLOM >60   CALCIUM 8.9     Mag:   Recent Labs     02/01/23  1725   MG 2.0       LIVER PROFILE:  Recent Labs     02/01/23  1725   AST 20   ALT 19   LABALBU 3.9      Latest Reference Range & Units 2/1/23 17:25 2/1/23 18:36 2/2/23 08:55   Troponin, High Sensitivity 0 - 11 ng/L 16 (H) 16 (H) 20 (H)   (H): Data is abnormally high    CXR:  Impression   No acute process. Assessment:  Left chest/shoulder pain: Probably musculoskeletal in origin. Mildly elevated, flat troponin presentation without ischemic changes on EKG not consistent with ACS. Ventricular bigeminy: Probably chronic.  patient reports history of \"irregular heartbeat\". Asymptomatic. Hypertension, controlled  Hypothyroidism, on HRT  YENY compliant with CPAP  History of recurrent unprovoked DVTs negative hematologic studies. On Xarelto daily  Diabetes controlled with diet    Plan:  Echocardiogram to assess the patient ejection fraction, rule out valvular heart disease and assess RV systolic pressure. No need for repeat stress test done in July of last year. Continue Xarelto. Start Toprol 25 mg  twice daily. Give 1 dose 50 mg Toprol now. Continue telemetry, monitor for ectopy response to Toprol administration. Consider EP consult if no change in bigeminy with Toprol administration. Monitor overnight. Monitor electrolytes: Keep potassium> 4.0 and magnesium> 2.0. Rest per primary service other consultants. Case and subsequent orders discussed with Dr. Luis Eduardo Brandon. Further recommendations to follow as per above and per clinical course. Will follow. Electronically signed by JENNIFER Alfred CNP on 2/2/2023 at 9:25 AM      Patient chart reviewed with JENNIFER Alfred CNP. Patient current hospitalization, past medical history, medications, EKGs, laboratory data, and imaging were all reviewed. Patient telemetry was also reviewed. Patient seen and examined in the presence of his girlfriend. I agree with the above assessment and plan made in collaboration with JENNIFER Alfred CNP. Thank you for allowing me to share in the care of patient.    Justice Richardson MD

## 2023-02-02 NOTE — H&P
Hospital Medicine History & Physical      PCP: Sita Morning, DO    Date of Admission: 2/1/2023    Date of Service: Pt seen/examined on 2/2/23 and Admitted to Inpatient with expected LOS greater than two midnights due to medical therapy. Chief Complaint:  chest pain      History Of Present Illness: Patient is a pleasant 74-year-old male with past medical history of BPH, atrial fibrillation on Xarelto, diabetes mellitus type 2, on metformin, hypothyroidism who presented to ED with complaints of exertional chest pain that started today while running on a treadmill and was admitted to rule out ACS. Troponins have been stable. EKG acutely unremarkable. Patient otherwise denies any shortness of breath, palpitations, fever, chills, trauma, nausea, vomiting, abdominal pain, dysuria, cough, or dizziness. Past Medical History:          Diagnosis Date    Diabetes mellitus ([address]     Hearing loss     slight    History of duodenal ulcer age 23    Hx of blood clots     Lactose intolerance     Metabolic bone disease     Dr Jason Mendosa yearly; pt states sees Dr Jason Mendosa for osteopenia    Nausea & vomiting     Osteoarthritis     Osteoporosis     Preoperative clearance 03/01/2014    medical-Dr. Heladio Friedman; paper copy on chart    Sleep apnea     does not use cpap    Thyroid disease     Wears glasses        Past Surgical History:          Procedure Laterality Date    ENDOSCOPY, COLON, DIAGNOSTIC      HIP ARTHROPLASTY Left 01/2006    [facility]    KNEE ARTHROPLASTY Bilateral 05/12/2014    Bilateral TKA  JOHNNIE Melvin MD    KNEE SURGERY Left 1986    Left knee meniscectomy [facility]    [employer]ATOR [employer]FF REPAIR         Medications Prior to Admission:      Prior to Admission medications    Medication Sig Start Date End Date Taking?  Authorizing Provider   tamsulosin (FLOMAX) 0.4 MG capsule Take 0.4 mg by mouth daily   Yes Historical Provider, MD   Rivaroxaban (XARELTO PO) Take 20 mg by mouth daily    Historical Provider, MD METFORMIN HCL PO Take 500 mg by mouth daily    Historical Provider, MD   vitamin B-12 (CYANOCOBALAMIN) 100 MCG tablet Take 50 mcg by mouth daily    Historical Provider, MD   calcium carbonate (TUMS) 500 MG chewable tablet Take 1 tablet by mouth daily    Historical Provider, MD   acetaminophen 650 MG TABS Take 650 mg by mouth every 4 hours as needed. Patient not taking: Reported on 2/2/2023 5/13/14   Daiana Holden MD   thyroid (ARMOUR) 30 MG tablet Take 30 mg by mouth daily. Historical Provider, MD   Lactase (LACTAID PO) Take  by mouth as needed. Historical Provider, MD   b complex vitamins capsule Take 1 capsule by mouth daily. Historical Provider, MD   vitamin D (CHOLECALCIFEROL) 1000 UNIT TABS tablet Take 2,000 Units by mouth daily    Historical Provider, MD       Allergies: Other, Darvocet a500 [propoxyphene n-acetaminophen], and Percocet [oxycodone-acetaminophen]    Social History:        TOBACCO:   reports that he has never smoked. He has never used smokeless tobacco.  ETOH:   reports that he does not currently use alcohol. Family History:            Problem Relation Age of Onset    Arrhythmia Mother     Cancer Father        REVIEW OF SYSTEMS:   Pertinent positives as noted in the HPI. All other systems reviewed and negative. PHYSICAL EXAM:    /81   Pulse 70   Temp 96.8 °F (36 °C) (Temporal)   Resp 14   Ht 5' 10\" (1.778 m)   Wt 170 lb 3 oz (77.2 kg)   SpO2 98%   BMI 24.42 kg/m²     General appearance:  No apparent distress, appears stated age and cooperative. HEENT:  Normal cephalic, atraumatic without obvious deformity. Pupils equal, round, and reactive to light. Extra ocular muscles intact. Conjunctivae/corneas clear. Neck: Supple, with full range of motion. No jugular venous distention. Trachea midline. Respiratory:  Normal respiratory effort. Clear to auscultation, bilaterally without Rales/Wheezes/Rhonchi.   Cardiovascular:  Regular rate and rhythm with normal S1/S2 without murmurs, rubs or gallops. Abdomen: Soft, non-tender, non-distended with normal bowel sounds. Musculoskeletal:  No clubbing, cyanosis or edema bilaterally. Full range of motion without deformity. Skin: Skin color, texture, turgor normal.  No rashes or lesions. Neurologic:  Neurovascularly intact without any focal sensory/motor deficits. Cranial nerves: II-XII intact, grossly non-focal.  Psychiatric:  Alert and oriented, thought content appropriate, normal insight    EKG:  I have reviewed the EKG with the following interpretation: NSR    Labs:     Recent Labs     02/01/23  1725   WBC 5.7   HGB 16.0   HCT 46.4        Recent Labs     02/01/23  1725      K 3.9      CO2 27   BUN 29*   CREATININE 1.0   CALCIUM 8.9     Recent Labs     02/01/23  1725   AST 20   ALT 19   BILITOT 0.4   ALKPHOS 61     No results for input(s): INR in the last 72 hours. No results for input(s): Normajean McGraws in the last 72 hours. Urinalysis:    No results found for: Darene Wallace, BACTERIA, RBCUA, BLOODU, SPECGRAV, GLUCOSEU      ASSESSMENT:    Active Hospital Problems    Diagnosis Date Noted    Chest pain [R07.9] 02/02/2023     Priority: Medium   DMII  HTN  Hypothyroidism  BPH    PLAN:  Trend trops to peak, tele, asa, cardio eval. C/w rest of home meds. DVT Prophylaxis: William Albert  Diet: ADULT DIET;  Regular; 4 carb choices (60 gm/meal)  Code Status: Full Code      Dispo - home when cleared by cardio       Viktoria Shafer MD

## 2023-02-03 VITALS
OXYGEN SATURATION: 96 % | HEIGHT: 70 IN | WEIGHT: 170.19 LBS | RESPIRATION RATE: 18 BRPM | SYSTOLIC BLOOD PRESSURE: 90 MMHG | DIASTOLIC BLOOD PRESSURE: 58 MMHG | TEMPERATURE: 98.3 F | HEART RATE: 67 BPM | BODY MASS INDEX: 24.37 KG/M2

## 2023-02-03 LAB
ANION GAP SERPL CALCULATED.3IONS-SCNC: 12 MMOL/L (ref 7–16)
BUN BLDV-MCNC: 20 MG/DL (ref 6–23)
CALCIUM SERPL-MCNC: 9.2 MG/DL (ref 8.6–10.2)
CHLORIDE BLD-SCNC: 104 MMOL/L (ref 98–107)
CHOLESTEROL, TOTAL: 210 MG/DL (ref 0–199)
CO2: 27 MMOL/L (ref 22–29)
CREAT SERPL-MCNC: 1.2 MG/DL (ref 0.7–1.2)
GFR SERPL CREATININE-BSD FRML MDRD: >60 ML/MIN/1.73
GLUCOSE BLD-MCNC: 166 MG/DL (ref 74–99)
HBA1C MFR BLD: 6.7 % (ref 4–5.6)
HDLC SERPL-MCNC: 62 MG/DL
LDL CHOLESTEROL CALCULATED: 125 MG/DL (ref 0–99)
MAGNESIUM: 2.1 MG/DL (ref 1.6–2.6)
METER GLUCOSE: 159 MG/DL (ref 74–99)
POTASSIUM SERPL-SCNC: 4.5 MMOL/L (ref 3.5–5)
SODIUM BLD-SCNC: 143 MMOL/L (ref 132–146)
T4 FREE: 1.26 NG/DL (ref 0.93–1.7)
TRIGL SERPL-MCNC: 114 MG/DL (ref 0–149)
TSH SERPL DL<=0.05 MIU/L-ACNC: 2.33 UIU/ML (ref 0.27–4.2)
VLDLC SERPL CALC-MCNC: 23 MG/DL

## 2023-02-03 PROCEDURE — G0378 HOSPITAL OBSERVATION PER HR: HCPCS

## 2023-02-03 PROCEDURE — 83735 ASSAY OF MAGNESIUM: CPT

## 2023-02-03 PROCEDURE — 93242 EXT ECG>48HR<7D RECORDING: CPT

## 2023-02-03 PROCEDURE — 82962 GLUCOSE BLOOD TEST: CPT

## 2023-02-03 PROCEDURE — 6370000000 HC RX 637 (ALT 250 FOR IP): Performed by: STUDENT IN AN ORGANIZED HEALTH CARE EDUCATION/TRAINING PROGRAM

## 2023-02-03 PROCEDURE — APPSS30 APP SPLIT SHARED TIME 16-30 MINUTES

## 2023-02-03 PROCEDURE — 36415 COLL VENOUS BLD VENIPUNCTURE: CPT

## 2023-02-03 PROCEDURE — 99231 SBSQ HOSP IP/OBS SF/LOW 25: CPT | Performed by: INTERNAL MEDICINE

## 2023-02-03 PROCEDURE — 6370000000 HC RX 637 (ALT 250 FOR IP)

## 2023-02-03 PROCEDURE — 84443 ASSAY THYROID STIM HORMONE: CPT

## 2023-02-03 PROCEDURE — 80061 LIPID PANEL: CPT

## 2023-02-03 PROCEDURE — 83036 HEMOGLOBIN GLYCOSYLATED A1C: CPT

## 2023-02-03 PROCEDURE — 80048 BASIC METABOLIC PNL TOTAL CA: CPT

## 2023-02-03 PROCEDURE — 84439 ASSAY OF FREE THYROXINE: CPT

## 2023-02-03 RX ORDER — ATORVASTATIN CALCIUM 40 MG/1
40 TABLET, FILM COATED ORAL NIGHTLY
Qty: 30 TABLET | Refills: 0 | Status: SHIPPED | OUTPATIENT
Start: 2023-02-03

## 2023-02-03 RX ORDER — INSULIN LISPRO 100 [IU]/ML
0-4 INJECTION, SOLUTION INTRAVENOUS; SUBCUTANEOUS
Status: DISCONTINUED | OUTPATIENT
Start: 2023-02-03 | End: 2023-02-03 | Stop reason: HOSPADM

## 2023-02-03 RX ORDER — ATORVASTATIN CALCIUM 40 MG/1
40 TABLET, FILM COATED ORAL NIGHTLY
Status: DISCONTINUED | OUTPATIENT
Start: 2023-02-03 | End: 2023-02-03 | Stop reason: HOSPADM

## 2023-02-03 RX ORDER — DEXTROSE MONOHYDRATE 100 MG/ML
INJECTION, SOLUTION INTRAVENOUS CONTINUOUS PRN
Status: DISCONTINUED | OUTPATIENT
Start: 2023-02-03 | End: 2023-02-03 | Stop reason: HOSPADM

## 2023-02-03 RX ORDER — ASPIRIN 81 MG/1
81 TABLET, CHEWABLE ORAL DAILY
Qty: 30 TABLET | Refills: 0 | Status: SHIPPED | OUTPATIENT
Start: 2023-02-04

## 2023-02-03 RX ORDER — METOPROLOL SUCCINATE 25 MG/1
25 TABLET, EXTENDED RELEASE ORAL 2 TIMES DAILY
Qty: 30 TABLET | Refills: 0 | Status: SHIPPED | OUTPATIENT
Start: 2023-02-03 | End: 2023-02-09 | Stop reason: SDUPTHER

## 2023-02-03 RX ORDER — INSULIN LISPRO 100 [IU]/ML
0-4 INJECTION, SOLUTION INTRAVENOUS; SUBCUTANEOUS NIGHTLY
Status: DISCONTINUED | OUTPATIENT
Start: 2023-02-03 | End: 2023-02-03 | Stop reason: HOSPADM

## 2023-02-03 RX ADMIN — TAMSULOSIN HYDROCHLORIDE 0.4 MG: 0.4 CAPSULE ORAL at 08:55

## 2023-02-03 RX ADMIN — RIVAROXABAN 20 MG: 20 TABLET, FILM COATED ORAL at 08:55

## 2023-02-03 RX ADMIN — METFORMIN HYDROCHLORIDE 500 MG: 500 TABLET ORAL at 08:55

## 2023-02-03 RX ADMIN — METOPROLOL SUCCINATE 25 MG: 25 TABLET, EXTENDED RELEASE ORAL at 08:54

## 2023-02-03 RX ADMIN — ASPIRIN 81 MG: 81 TABLET, CHEWABLE ORAL at 08:55

## 2023-02-03 RX ADMIN — LEVOTHYROXINE, LIOTHYRONINE 30 MG: 19; 4.5 TABLET ORAL at 08:55

## 2023-02-03 ASSESSMENT — PAIN SCALES - GENERAL
PAINLEVEL_OUTOF10: 0
PAINLEVEL_OUTOF10: 0

## 2023-02-03 NOTE — CARE COORDINATION
2/3 Care Coordination: Await ECHO to be read, Possible discharge depending on results. Pt is Independent, Has no Home going needs. CM/SW will continue to follow for discharge planning.    Melissa FERNANDES,RN--BC  [phone]

## 2023-02-03 NOTE — PROGRESS NOTES
Inpatient Cardiology Progress note     PATIENT IS BEING FOLLOWED FOR: Chest pain    Nguyen Prince is a 81 y.o. male who is known to Dr. Angela Owens in [facility]. Patient is being seen by Dr. Adan Mckeon during this consultation. SUBJECTIVE: Patient has no complaints overnight patient reports he feels at baseline and is ready for discharge. Patient denies recurrence of chest pain/shoulder pain, SOB, WILSON, dizziness, syncope, nausea, diaphoresis, or palpitations. OBJECTIVE: Patient is lying semi-King in hospital bed on. Patient is alert and oriented, speaking full sentences, is in no apparent distress at this time. Exam is unremarkable. Telemetry reveals transition from bigeminy to Quadrigeminy since initiation of Toprol yesterday. Echocardiogram has been done and is pending read. ROS:  Consist: Denies fevers, chills or night sweats  Heart: Denies chest pain, palpitations, lightheadedness, dizziness or syncope  Lungs: Denies SOB, cough, wheezing, orthopnea or PND  GI: Denies abdominal pain, vomiting or diarrhea    PHYSICAL EXAM:   /68   Pulse 100   Temp 97.5 °F (36.4 °C) (Temporal)   Resp 16   Ht 5' 10\" (1.778 m)   Wt 170 lb 3 oz (77.2 kg)   SpO2 96%   BMI 24.42 kg/m²    B/P Range last 24 hours: Systolic (28EFB), OUW:388 , Min:109 , HGY:373    Diastolic (89DFF), SXK:42, Min:68, Max:86    CONST: Well developed, well nourished who appears stated age. Awake, alert and cooperative. No apparent distress  HEENT:   Head- Normocephalic, atraumatic   Eyes- Conjunctivae pink, anicteric  Throat- Oral mucosa pink and moist  Neck-  No stridor, trachea midline, no jugular venous distention. No carotid bruit  CHEST: Chest symmetrical and non-tender to palpation.  No accessory muscle use or intercostal retractions  RESPIRATORY:  Lung sounds - clear throughout fields   CARDIOVASCULAR:     Heart Inspection- shows no noted pulsations  Heart Palpation- no heaves or thrills; PMI is non-displaced   Heart Ausculation- Regular rate and rhythm, no murmur. No s3, s4 or rub   PV: No lower extremity edema. No varicosities. Pedal pulses palpable, no clubbing or cyanosis   ABDOMEN: Soft, non-tender to light palpation. Bowel sounds present. No palpable masses no organomegaly; no abdominal bruit  MS: Good muscle strength and tone. No atrophy or abnormal movements. : Deferred  SKIN: Warm and dry no statis dermatitis or ulcers   NEURO / PSYCH: Oriented to person, place and time. Speech clear and appropriate. Follows all commands. Pleasant affect     No intake or output data in the 24 hours ending 02/03/23 0858    Weight:   Wt Readings from Last 3 Encounters:   02/02/23 170 lb 3 oz (77.2 kg)   06/25/21 180 lb (81.6 kg)   02/20/18 190 lb 14.4 oz (86.6 kg)     Current Inpatient Medications:   aspirin  81 mg Oral Daily    rivaroxaban  20 mg Oral Daily    tamsulosin  0.4 mg Oral Daily    thyroid  30 mg Oral Daily    metFORMIN  500 mg Oral Daily    metoprolol succinate  25 mg Oral BID       IV Infusions (if any):      DIAGNOSTIC/ LABORATORY DATA:  Labs:   CBC:   Recent Labs     02/01/23  1725   WBC 5.7   HGB 16.0   HCT 46.4        BMP:   Recent Labs     02/01/23  1725      K 3.9   CO2 27   BUN 29*   CREATININE 1.0   LABGLOM >60   CALCIUM 8.9     Mag:   Recent Labs     02/01/23  1725   MG 2.0       TFT:   Lab Results   Component Value Date    TSH 0.424 05/13/2014    FT3 3.4 03/19/2014    T4FREE 1.00 03/19/2014      CARDIAC ENZYMES:  Recent Labs     02/01/23  1725 02/01/23  1836 02/02/23  0855   TROPHS 16* 16* 20*     LIVER PROFILE:  Recent Labs     02/01/23  1725   AST 20   ALT 19   LABALBU 3.9       CXR:   Impression   No acute process. Telemetry: Sinus rhythm with Quadrigeminy, heart rate 66. Nocturnal bradycardia in the 30s with what appears to be Mobitz type II.    12 lead EKG: Rhythm with frequent premature ventricular complexes and pattern of bigeminy.   Vent rate 92, MO interval 148, QRS duration 72, QTc 430    Echo:    Technically adequate study. Sclerotic aortic valve. Grade 1 diastolic dysfunction. ? Small left pleural effusion. EF > 70%. Assessment:  Left chest/shoulder pain: Probably musculoskeletal in origin. Mildly elevated, flat troponin presentation without ischemic changes on EKG not consistent with ACS. TTE showing EF> 70% with stage I DD.  NWMA. RVSP 28 mmHg. Trace TR.  AV appears mildly sclerotic. Questionable small left pleural effusion. CXR appears clear. Ventricular bigeminy transitioned Quadrigeminy initiation of Toprol. Asymptomatic. Hyperlipidemia, started on Lipitor. Hypertension, controlled  Hypothyroidism, on HRT  YENY compliant with CPAP  History of recurrent unprovoked DVTs negative hematologic studies. On Xarelto daily  Diabetes controlled with diet     Plan:  Agree with starting Lipitor. Discharge on Toprol 25 twice daily, Lipitor 40 daily, ASA 81 mg daily, and Xarelto 20 mg daily. No need for repeat stress test done in July of last year. ZIO XT 14-day monitor at discharge. Follow-up in 4 to 5 weeks with electrophysiology outpatient for bigeminy/Quadrigeminy. Patient wishes to switch to [facility] cardiology from Dr. Mercy Zavala at this time. Patient to follow-up with Dr. Randolph Luque in 4 to 5 weeks. Aggressive risk modification discussed including healthy diet, exercise as tolerated, and medical compliance. Rest per primary service other consultants. Case and subsequent orders discussed with Dr. Randolph Luque. Patient okay for discharge from cardiology standpoint. Cardiology will sign off at this time. Please call with any questions/concerns. Thank you. Electronically signed by JENNIFER Abdul CNP on 2/3/2023 at 8:58 AM       Patient chart reviewed with JENNIFER Abdul CNP. I agree with the above assessment and plan been in collaboration with JENNIFER Abdul CNP.

## 2023-02-03 NOTE — DISCHARGE SUMMARY
Hospitalist Discharge Summary    Patient ID: Leigh Ann Mosquera   Patient : 1950  Patient's PCP: Nereida Maldonado DO    Admit Date: 2023   Admitting Physician: Burgess Stan DO    Discharge Date:  2/3/2023   Discharge Physician: JENNIFER Mcwilliams NP   Discharge Condition: Stable  Discharge Disposition: [facility] course in brief:  (Please refer to daily progress notes for a comprehensive review of the hospitalization by requesting medical records)    Patient presented to the ED with complaints of exertional chest pain after exercising today. He work-up relatively unremarkable. Cardiology was consulted and echocardiogram was ordered. Echocardiogram without significant dysfunction. Cardiology is cleared for discharge with plans for Zio patch and outpatient follow-up in 4 to 5 weeks. Consults:   IP CONSULT TO CARDIOLOGY    Discharge Diagnoses:  Chest pain  Ventricular bigeminy  Hyperlipidemia  Hypertension  Hypothyroidism  YENY on CPAP  History of DVTs anticoagulated on Xarelto  DM2     Discharge Instructions / Follow up: Follow-up with PCP within 1 week of discharge. Follow-up with cardiology in 4 to 5 weeks  Zio patch to be followed by cardiology. Compliance with medications as prescribed on discharge. The patient's condition is stable. At this time the patient is without objective evidence of an acute process requiring continuing hospitalization or inpatient management. They are stable for discharge with outpatient follow-up. I have spoken with the patient and discussed the results of the current hospitalization, in addition to providing specific details for the plan of care and counseling regarding the diagnosis and prognosis. The plan has been discussed in detail and they are aware of the specific conditions for emergent return, as well as the importance of follow-up.   Their questions are answered at this time and they are agreeable with the plan for discharge home. Continued appropriate risk factor modification of blood pressure, diabetes and serum lipids will remain essential to reducing risk of future atherosclerotic development    Activity: activity as tolerated    Physical exam:  General appearance: Elderly male in no apparent distress, appears stated age and cooperative. HEENT: Conjunctivae/corneas clear. Mucous membranes moist.  Neck: Supple. No JVD. Respiratory:  Clear to auscultation bilaterally. Normal respiratory effort. Cardiovascular:  RRR. S1, S2 without MRG. PV: Pulses palpable. No edema. Abdomen: Soft, non-tender, non-distended. +BS  Musculoskeletal: No obvious deformities. Skin: Normal skin color. No rashes or lesions. Good turgor. Neurologic:  Grossly non-focal. Awake, alert, following commands. Psychiatric: Alert and oriented, thought content appropriate, normal insight and judgement    Significant labs:  CBC:   Recent Labs     02/01/23  1725   WBC 5.7   RBC 5.21   HGB 16.0   HCT 46.4   MCV 89.1   RDW 12.9        BMP:   Recent Labs     02/01/23  1725 02/03/23  0838    143   K 3.9 4.5    104   CO2 27 27   BUN 29* 20   CREATININE 1.0 1.2   MG 2.0 2.1     LFT:  Recent Labs     02/01/23  1725   PROT 6.1*   ALKPHOS 61   ALT 19   AST 20   BILITOT 0.4     PT/INR: No results for input(s): INR, APTT in the last 72 hours. BNP: No results for input(s): BNP in the last 72 hours. Hgb A1C:   Lab Results   Component Value Date    LABA1C 6.7 (H) 02/03/2023     Folate and B12: No results found for: Janice Riedel, No results found for: FOLATE  Thyroid Studies:   Lab Results   Component Value Date    TSH 2.330 02/03/2023       Urinalysis:  No results found for: NITRU, WBCUA, BACTERIA, RBCUA, BLOODU, SPECGRAV, GLUCOSEU    Imaging:  XR CHEST PORTABLE    Result Date: 2/1/2023  EXAMINATION: ONE XRAY VIEW OF THE CHEST 2/1/2023 5:53 pm COMPARISON: None.  HISTORY: ORDERING SYSTEM PROVIDED HISTORY: pain TECHNOLOGIST PROVIDED HISTORY: Reason for exam:->pain FINDINGS: The lungs are without acute focal process. There is no effusion or pneumothorax. The cardiomediastinal silhouette is without acute process. The osseous structures are without acute process. No acute process. Discharge Medications:      Medication List        STOP taking these medications      aspirin 81 MG tablet     bisacodyl 10 MG suppository  Commonly known as: DULCOLAX     docusate 100 MG Caps  Commonly known as: COLACE, DULCOLAX     fish oil 1000 MG Cpdr     METAMUCIL PO     MULTIVITAMIN PO     Red Yeast Rice 600 MG Caps     vitamin C 250 MG tablet     vitamin E 1000 units capsule     Zinc 100 MG Tabs            ASK your doctor about these medications      Acetaminophen 650 MG Tabs     b complex vitamins capsule     calcium carbonate 500 MG chewable tablet  Commonly known as: TUMS     LACTAID PO     METFORMIN HCL PO     tamsulosin 0.4 MG capsule  Commonly known as: FLOMAX     thyroid 30 MG tablet  Commonly known as: ARMOUR     vitamin B-12 100 MCG tablet  Commonly known as: CYANOCOBALAMIN     vitamin D 1000 UNIT Tabs tablet  Commonly known as: CHOLECALCIFEROL     XARELTO PO              Time Spent on discharge is more than 45 minutes in the examination, evaluation, counseling and review of medications and discharge plan.    +++++++++++++++++++++++++++++++++++++++++++++++++  JENNIFER Holliday - NP  [address]  +++++++++++++++++++++++++++++++++++++++++++++++++  NOTE: This report was transcribed using voice recognition software. Every effort was made to ensure accuracy; however, inadvertent computerized transcription errors may be present.

## 2023-02-03 NOTE — PROGRESS NOTES
ZIO XT patch applied. Discharged instructions given to patient. Educated patient on the importance of medication compliance. Educated the patient on the importance of follow up appointments. All questions answered appropriately. Ivs removed and heart monitor cleansed and placed back in nurses station.

## 2023-02-06 ENCOUNTER — TELEPHONE (OUTPATIENT)
Dept: ADMINISTRATIVE | Age: 73
End: 2023-02-06

## 2023-02-06 NOTE — TELEPHONE ENCOUNTER
Pt calling for HFU apt/timing with Dr. Avila Cantrell discharged on: 2/4/23 dx: CP. He is also going to need a refill on his metoprolol before his HFU apt. Thank you.

## 2023-02-09 DIAGNOSIS — I49.9 VENTRICULAR ARRHYTHMIA: Primary | ICD-10-CM

## 2023-02-09 RX ORDER — METOPROLOL SUCCINATE 25 MG/1
25 TABLET, EXTENDED RELEASE ORAL 2 TIMES DAILY
Qty: 60 TABLET | Refills: 1 | Status: SHIPPED | OUTPATIENT
Start: 2023-02-09

## 2023-02-20 ENCOUNTER — TELEPHONE (OUTPATIENT)
Dept: CARDIOLOGY CLINIC | Age: 73
End: 2023-02-20

## 2023-02-20 NOTE — TELEPHONE ENCOUNTER
EDUARDO CALLED C/O FEELING DOWN, TIRED, AND JOINT PAIN SINCE STARTING THE METOPROLOL AND ATORVASTATIN. HE HAS AN APPT. ON 3/9/23 TO SEE YOU. HE DOES NOT KNOW IF HE HAS ENOUGH OF THE METOPROLOL AND ATORVASTATIN TO LAST. SHOULD HE STAY ON THESE. I TOLD HIM TO STAY ON THE METOPROLOL BUT TO D/C THE ATORVASTATIN UNTIL HE COMES IN.   JANINA

## 2023-03-03 ENCOUNTER — TELEPHONE (OUTPATIENT)
Dept: CARDIOLOGY CLINIC | Age: 73
End: 2023-03-03

## 2023-03-03 DIAGNOSIS — I49.9 VENTRICULAR ARRHYTHMIA: ICD-10-CM

## 2023-03-03 RX ORDER — METOPROLOL SUCCINATE 50 MG/1
50 TABLET, EXTENDED RELEASE ORAL 2 TIMES DAILY
Qty: 180 TABLET | Refills: 3 | Status: SHIPPED | OUTPATIENT
Start: 2023-03-03

## 2023-03-03 NOTE — TELEPHONE ENCOUNTER
I CALLED EDUARDO REGARDING HIS EVENT MONITOR. I GAVE HIM THE RESULTS. PER DR RG HE NEEDS TO INCREASE HIS TOPROL TO 50 MG BID AND AN EP CONSULT. HE IS ALREADY SCHEDULED TO SEE DR DOMINGUEZ.   EILEENL

## 2023-03-09 ENCOUNTER — OFFICE VISIT (OUTPATIENT)
Dept: CARDIOLOGY CLINIC | Age: 73
End: 2023-03-09
Payer: MEDICARE

## 2023-03-09 VITALS
RESPIRATION RATE: 16 BRPM | WEIGHT: 176.8 LBS | HEART RATE: 82 BPM | SYSTOLIC BLOOD PRESSURE: 112 MMHG | HEIGHT: 70 IN | OXYGEN SATURATION: 97 % | BODY MASS INDEX: 25.31 KG/M2 | DIASTOLIC BLOOD PRESSURE: 60 MMHG

## 2023-03-09 DIAGNOSIS — I10 PRIMARY HYPERTENSION: Primary | ICD-10-CM

## 2023-03-09 PROCEDURE — 1036F TOBACCO NON-USER: CPT | Performed by: INTERNAL MEDICINE

## 2023-03-09 PROCEDURE — 99213 OFFICE O/P EST LOW 20 MIN: CPT | Performed by: INTERNAL MEDICINE

## 2023-03-09 PROCEDURE — G8484 FLU IMMUNIZE NO ADMIN: HCPCS | Performed by: INTERNAL MEDICINE

## 2023-03-09 PROCEDURE — 3078F DIAST BP <80 MM HG: CPT | Performed by: INTERNAL MEDICINE

## 2023-03-09 PROCEDURE — G8427 DOCREV CUR MEDS BY ELIG CLIN: HCPCS | Performed by: INTERNAL MEDICINE

## 2023-03-09 PROCEDURE — 3074F SYST BP LT 130 MM HG: CPT | Performed by: INTERNAL MEDICINE

## 2023-03-09 PROCEDURE — G8417 CALC BMI ABV UP PARAM F/U: HCPCS | Performed by: INTERNAL MEDICINE

## 2023-03-09 PROCEDURE — 93000 ELECTROCARDIOGRAM COMPLETE: CPT | Performed by: INTERNAL MEDICINE

## 2023-03-09 PROCEDURE — 1123F ACP DISCUSS/DSCN MKR DOCD: CPT | Performed by: INTERNAL MEDICINE

## 2023-03-09 PROCEDURE — 3017F COLORECTAL CA SCREEN DOC REV: CPT | Performed by: INTERNAL MEDICINE

## 2023-03-09 RX ORDER — SILDENAFIL CITRATE 20 MG/1
TABLET ORAL
COMMUNITY
Start: 2023-02-10

## 2023-03-09 ASSESSMENT — ENCOUNTER SYMPTOMS
SHORTNESS OF BREATH: 0
COUGH: 0
CONSTIPATION: 0
ABDOMINAL PAIN: 0
NAUSEA: 0
VOMITING: 0
BLOOD IN STOOL: 0
WHEEZING: 0
BACK PAIN: 0
DIARRHEA: 0

## 2023-03-09 NOTE — PROGRESS NOTES
OUTPATIENT CARDIOLOGY FOLLOW-UP    HPI:    Name: Nereyda Duncan    Age: 81 y.o. Primary Care Physician: Cosmo Hernandez DO    Date of Service: 3/9/2023    Chief Complaint:   Chief Complaint   Patient presents with    Hypertension     Post hosp visit. Diabetes    Sleep Apnea        History of present illness :   79-year-old male who comes today for follow-up visit after recent hospitalization. He was seen at the hospital in consultation on 2/1/2023 due to left chest/shoulder pain. His pain was felt to be musculoskeletal in origin. His troponin was mildly elevated but not consistent with ACS. He has history of chronic ventricular bigeminy, hypertension, hypothyroidism, obstructive sleep apnea treated with CPAP at night, recurrent DVT and diabetes. Patient was started on Toprol. Patient does not smoke. He is very active. He walks 2 miles a day with no chest discomfort or dyspnea on exertion. He feels good today. He discontinued his atorvastatin due to aching. He felt lightheaded 1 time after hospital discharge. He denies palpitations or syncope. He admits to get lower extremity edema. He eats chocolate almost daily and drinks 2 large cups of coffee a day. EKG done today revealed sinus rhythm at 82 bpm with ventricular trigeminy, left atrial enlargement, diffuse low voltage and probable early repolarization pattern. Review of Systems:   Review of Systems   Constitutional:  Negative for chills, fatigue and fever. HENT:  Negative for nosebleeds. Decreased hearing. Respiratory:  Negative for cough, shortness of breath and wheezing. Cardiovascular:  Positive for leg swelling. Gastrointestinal:  Negative for abdominal pain, blood in stool, constipation, diarrhea, nausea and vomiting. GERD. Genitourinary:  Negative for dysuria and hematuria. Musculoskeletal:  Negative for back pain, joint swelling and myalgias. Skin:         Aching.    Neurological:  Negative for syncope and light-headedness. Psychiatric/Behavioral:  The patient is not nervous/anxious. Past Medical History:  Past Medical History:   Diagnosis Date    Diabetes mellitus ([address]     Hearing loss     slight    History of duodenal ulcer age 23    Hx of blood clots     Lactose intolerance     Metabolic bone disease     Dr Rocio Naidu yearly; pt states sees Dr Rocio Naidu for osteopenia    Nausea & vomiting     Osteoarthritis     Osteoporosis     Preoperative clearance 03/01/2014    medical-Dr. Carter Garcia; paper copy on chart    Sleep apnea     does not use cpap    Thyroid disease     Wears glasses        Past Surgical History:  Past Surgical History:   Procedure Laterality Date    ENDOSCOPY, COLON, DIAGNOSTIC      HIP ARTHROPLASTY Left 01/2006    [facility]    KNEE ARTHROPLASTY Bilateral 05/12/2014    Bilateral TKA  JOHNNIE Jane MD    KNEE SURGERY Left 1986    Left knee meniscectomy [facility]    ROTATOR CUFF REPAIR         Family History:  Family History   Problem Relation Age of Onset    Arrhythmia Mother     Cancer Father        Social History:  Social History     Socioeconomic History    Marital status:      Spouse name: Not on file    Number of children: 5    Years of education: 14    Highest education level: Not on file   Occupational History    Not on file   Tobacco Use    Smoking status: Never    Smokeless tobacco: Never   Vaping Use    Vaping Use: Never used   Substance and Sexual Activity    Alcohol use: Not Currently     Comment: wine 1-2 per week    Drug use: No    Sexual activity: Yes     Partners: Female   Other Topics Concern    Not on file   Social History Narrative    Not on file     Social Determinants of Health     Financial Resource Strain: Not on file   Food Insecurity: Not on file   Transportation Needs: Not on file   Physical Activity: Not on file   Stress: Not on file   Social Connections: Not on file   Intimate Partner Violence: Not on file   Housing Stability: Not on file       Allergies:  Allergies   Allergen Reactions    Other      Cat dander, grass, dust    Darvocet A500 [Propoxyphene N-Acetaminophen] Nausea And Vomiting     lightheadedness    Percocet [Oxycodone-Acetaminophen] Nausea And Vomiting     lightheadedness       Current Medications:  Current Outpatient Medications   Medication Sig Dispense Refill    metoprolol succinate (TOPROL XL) 50 MG extended release tablet Take 1 tablet by mouth 2 times daily 180 tablet 3    aspirin 81 MG chewable tablet Take 1 tablet by mouth daily 30 tablet 0    atorvastatin (LIPITOR) 40 MG tablet Take 1 tablet by mouth nightly 30 tablet 0    tamsulosin (FLOMAX) 0.4 MG capsule Take 0.4 mg by mouth daily      Rivaroxaban (XARELTO PO) Take 20 mg by mouth daily      METFORMIN HCL PO Take 500 mg by mouth daily      vitamin B-12 (CYANOCOBALAMIN) 100 MCG tablet Take 50 mcg by mouth daily      calcium carbonate (TUMS) 500 MG chewable tablet Take 1 tablet by mouth daily      acetaminophen 650 MG TABS Take 650 mg by mouth every 4 hours as needed. 120 tablet     thyroid (ARMOUR) 30 MG tablet Take 30 mg by mouth daily.      Lactase (LACTAID PO) Take  by mouth as needed.      b complex vitamins capsule Take 1 capsule by mouth daily.      vitamin D (CHOLECALCIFEROL) 1000 UNIT TABS tablet Take 2,000 Units by mouth daily       No current facility-administered medications for this visit.       Physical Exam:  There were no vitals taken for this visit.  Wt Readings from Last 3 Encounters:   02/02/23 170 lb 3 oz (77.2 kg)   06/25/21 180 lb (81.6 kg)   02/20/18 190 lb 14.4 oz (86.6 kg)       Physical Exam  Constitutional:       General: He is not in acute distress.     Appearance: He is well-developed.   HENT:      Head: Normocephalic and atraumatic.   Neck:      Vascular: No carotid bruit or JVD.   Cardiovascular:      Rate and Rhythm: Normal rate and regular rhythm.      Heart sounds: No murmur heard.    No friction rub. No gallop.      Comments: Skip beats  noted.  Pulmonary:      Breath sounds: Normal breath sounds. No wheezing or rales. Chest:      Chest wall: No tenderness. Abdominal:      General: Bowel sounds are normal. There is no distension. Palpations: Abdomen is soft. There is no mass. Tenderness: There is no abdominal tenderness. Comments: No abdominal bruit. Musculoskeletal:      Cervical back: Neck supple. Right lower leg: Edema present. Left lower leg: Edema present. Comments: Trivial bilateral leg edema. Skin:     General: Skin is warm and dry. Neurological:      Mental Status: He is alert and oriented to person, place, and time. Laboratory Tests:  Lab Results   Component Value Date    CREATININE 1.2 02/03/2023    BUN 20 02/03/2023     02/03/2023    K 4.5 02/03/2023     02/03/2023    CO2 27 02/03/2023     Lab Results   Component Value Date/Time    MG 2.1 02/03/2023 08:38 AM     Lab Results   Component Value Date    WBC 5.7 02/01/2023    HGB 16.0 02/01/2023    HCT 46.4 02/01/2023    MCV 89.1 02/01/2023     02/01/2023     Lab Results   Component Value Date    ALT 19 02/01/2023    AST 20 02/01/2023    ALKPHOS 61 02/01/2023    BILITOT 0.4 02/01/2023       Lab Results   Component Value Date    TSH 2.330 02/03/2023     Lab Results   Component Value Date    LABA1C 6.7 (H) 02/03/2023     Lab Results   Component Value Date    CHOL 210 (H) 02/03/2023     Lab Results   Component Value Date    TRIG 114 02/03/2023     Lab Results   Component Value Date    HDL 62 02/03/2023     Lab Results   Component Value Date    LDLCALC 125 (H) 02/03/2023     Lab Results   Component Value Date    LABVLDL 23 02/03/2023       Cardiac Tests:    Reported normal nuclear stress test on 7/2022 at Dr. Cele Salvador office. Echocardiogram done on 2/2/2023. Technically adequate study. Sclerotic aortic valve. Grade 1 diastolic dysfunction. ? Small left pleural effusion. EF > 70%.     14-day eBIZ.mobilityO Skwibl monitor done between 2/3/2023 and 2/17/2023:  Sinus rhythm with average heart rate 85 bpm.  24.4% VE's with 1 episode of VT of 4 beats at 162 bpm.  Less than 1% SVE's with 13 episode of SVT. ASSESSMENT / PLAN:  -Ventricular arrhythmia: Asymptomatic and first noted in 2019. They could be due to or aggravated by excess caffeine intake. -Supraventricular tachycardia. -Hypertension: Controlled. -Grade 1 diastolic dysfunction: Clinically compensated. -Diabetes.  -History of blood clots.  -Obstructive sleep apnea treated with CPAP at night. We will continue current cardiac medications. We will try to obtain the Ulises Amador done at Dr. Parth Brito office last summer. Patient was advised to stop eating chocolate and to cut down on his coffee intake. Patient is scheduled to see Dr. Kityt Bowen. We will follow-up in my office and 1 year. The patient's current medication list, allergies, problem list and results of all previously ordered testing were reviewed at today's visit. Cristy Wiseman MD , [facility].   [facility]) Cardiology

## 2023-04-03 ENCOUNTER — OFFICE VISIT (OUTPATIENT)
Dept: NON INVASIVE DIAGNOSTICS | Age: 73
End: 2023-04-03
Payer: MEDICARE

## 2023-04-03 VITALS
BODY MASS INDEX: 24.94 KG/M2 | OXYGEN SATURATION: 97 % | WEIGHT: 174.2 LBS | RESPIRATION RATE: 18 BRPM | HEIGHT: 70 IN | SYSTOLIC BLOOD PRESSURE: 102 MMHG | HEART RATE: 65 BPM | DIASTOLIC BLOOD PRESSURE: 64 MMHG

## 2023-04-03 DIAGNOSIS — R07.9 CHEST PAIN, UNSPECIFIED TYPE: ICD-10-CM

## 2023-04-03 DIAGNOSIS — I48.91 NEW ONSET A-FIB (HCC): ICD-10-CM

## 2023-04-03 DIAGNOSIS — I49.9 VENTRICULAR ARRHYTHMIA: ICD-10-CM

## 2023-04-03 DIAGNOSIS — R94.31 ABNORMAL EKG: Primary | ICD-10-CM

## 2023-04-03 DIAGNOSIS — I49.9 VENTRICULAR ARRHYTHMIA: Primary | ICD-10-CM

## 2023-04-03 PROCEDURE — 1123F ACP DISCUSS/DSCN MKR DOCD: CPT | Performed by: INTERNAL MEDICINE

## 2023-04-03 PROCEDURE — 99203 OFFICE O/P NEW LOW 30 MIN: CPT | Performed by: INTERNAL MEDICINE

## 2023-04-03 PROCEDURE — G8417 CALC BMI ABV UP PARAM F/U: HCPCS | Performed by: INTERNAL MEDICINE

## 2023-04-03 PROCEDURE — G8427 DOCREV CUR MEDS BY ELIG CLIN: HCPCS | Performed by: INTERNAL MEDICINE

## 2023-04-03 PROCEDURE — 3017F COLORECTAL CA SCREEN DOC REV: CPT | Performed by: INTERNAL MEDICINE

## 2023-04-03 PROCEDURE — 1036F TOBACCO NON-USER: CPT | Performed by: INTERNAL MEDICINE

## 2023-04-03 PROCEDURE — 93000 ELECTROCARDIOGRAM COMPLETE: CPT | Performed by: INTERNAL MEDICINE

## 2023-08-04 ENCOUNTER — TELEPHONE (OUTPATIENT)
Dept: CARDIOLOGY CLINIC | Age: 73
End: 2023-08-04

## 2023-08-04 NOTE — TELEPHONE ENCOUNTER
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 HE HAS BEEN DOING GOOD ON THIS. HE DID HAVE AN EPISODE WHERE HE GOT OUT OF HIS TRUCK AND STARTED TO WALK AND FELT SOME DIZZINESS. THIS HAPPENED TWICE. I TOLD HIM THAT HE NEEDS TO ALLOW HIMSELF SOME TIME WHEN CHANGING POSITIONS BEFORE HE STARTS TO WALK.     PLEASE ADVISE

## 2023-09-12 ENCOUNTER — TELEPHONE (OUTPATIENT)
Dept: CARDIOLOGY CLINIC | Age: 73
End: 2023-09-12

## 2023-09-12 NOTE — TELEPHONE ENCOUNTER
Called the patient regarding above. He stated he would take his BP and call around the middle of next week.  Did not want to schedule an office visit yet

## 2023-09-12 NOTE — TELEPHONE ENCOUNTER
Patient called stating he has been feeling winded. He said he feels this way usually when he moving or walking fast. He has not been keeping track of his BP or heart rate, but stated he just ordered an automatic BP cuff from the insurance company.  Please advise

## 2023-09-12 NOTE — TELEPHONE ENCOUNTER
Please inform patient that his feeling of being short winded is probably due to his blood pressure and obstructive sleep apnea. He needs to monitor his blood pressure twice a day for the next several days to make sure his blood pressure is controlled. I can see him at the office if needed.

## 2023-10-25 ENCOUNTER — OFFICE VISIT (OUTPATIENT)
Dept: CARDIOLOGY CLINIC | Age: 73
End: 2023-10-25
Payer: MEDICARE

## 2023-10-25 VITALS
WEIGHT: 176.6 LBS | OXYGEN SATURATION: 97 % | HEIGHT: 70 IN | SYSTOLIC BLOOD PRESSURE: 120 MMHG | DIASTOLIC BLOOD PRESSURE: 72 MMHG | BODY MASS INDEX: 25.28 KG/M2 | HEART RATE: 99 BPM | RESPIRATION RATE: 16 BRPM

## 2023-10-25 DIAGNOSIS — I49.9 VENTRICULAR ARRHYTHMIA: Primary | ICD-10-CM

## 2023-10-25 PROCEDURE — 1123F ACP DISCUSS/DSCN MKR DOCD: CPT | Performed by: INTERNAL MEDICINE

## 2023-10-25 PROCEDURE — G8484 FLU IMMUNIZE NO ADMIN: HCPCS | Performed by: INTERNAL MEDICINE

## 2023-10-25 PROCEDURE — 3017F COLORECTAL CA SCREEN DOC REV: CPT | Performed by: INTERNAL MEDICINE

## 2023-10-25 PROCEDURE — G8417 CALC BMI ABV UP PARAM F/U: HCPCS | Performed by: INTERNAL MEDICINE

## 2023-10-25 PROCEDURE — 1036F TOBACCO NON-USER: CPT | Performed by: INTERNAL MEDICINE

## 2023-10-25 PROCEDURE — 99214 OFFICE O/P EST MOD 30 MIN: CPT | Performed by: INTERNAL MEDICINE

## 2023-10-25 PROCEDURE — 93000 ELECTROCARDIOGRAM COMPLETE: CPT | Performed by: INTERNAL MEDICINE

## 2023-10-25 PROCEDURE — G8427 DOCREV CUR MEDS BY ELIG CLIN: HCPCS | Performed by: INTERNAL MEDICINE

## 2023-10-25 RX ORDER — ENOXAPARIN SODIUM 150 MG/ML
INJECTION SUBCUTANEOUS
COMMUNITY
Start: 2023-10-06

## 2023-10-25 ASSESSMENT — ENCOUNTER SYMPTOMS
SHORTNESS OF BREATH: 0
ABDOMINAL PAIN: 0
BACK PAIN: 0
VOMITING: 0
NAUSEA: 0
COUGH: 0
CONSTIPATION: 0
WHEEZING: 0
DIARRHEA: 0
BLOOD IN STOOL: 0

## 2023-12-20 ENCOUNTER — TELEPHONE (OUTPATIENT)
Dept: ADMINISTRATIVE | Age: 73
End: 2023-12-20

## 2023-12-28 ENCOUNTER — OFFICE VISIT (OUTPATIENT)
Dept: CARDIOLOGY CLINIC | Age: 73
End: 2023-12-28
Payer: MEDICARE

## 2023-12-28 VITALS
HEIGHT: 70 IN | SYSTOLIC BLOOD PRESSURE: 118 MMHG | OXYGEN SATURATION: 98 % | HEART RATE: 94 BPM | BODY MASS INDEX: 26.08 KG/M2 | WEIGHT: 182.2 LBS | RESPIRATION RATE: 16 BRPM | DIASTOLIC BLOOD PRESSURE: 62 MMHG

## 2023-12-28 DIAGNOSIS — R00.0 TACHYCARDIA: Primary | ICD-10-CM

## 2023-12-28 DIAGNOSIS — R42 DIZZINESS: ICD-10-CM

## 2023-12-28 DIAGNOSIS — R06.09 DYSPNEA ON EXERTION: ICD-10-CM

## 2023-12-28 DIAGNOSIS — I49.9 VENTRICULAR ARRHYTHMIA: ICD-10-CM

## 2023-12-28 PROCEDURE — 1123F ACP DISCUSS/DSCN MKR DOCD: CPT | Performed by: INTERNAL MEDICINE

## 2023-12-28 PROCEDURE — G8427 DOCREV CUR MEDS BY ELIG CLIN: HCPCS | Performed by: INTERNAL MEDICINE

## 2023-12-28 PROCEDURE — 93000 ELECTROCARDIOGRAM COMPLETE: CPT | Performed by: INTERNAL MEDICINE

## 2023-12-28 PROCEDURE — 99214 OFFICE O/P EST MOD 30 MIN: CPT | Performed by: INTERNAL MEDICINE

## 2023-12-28 PROCEDURE — G8484 FLU IMMUNIZE NO ADMIN: HCPCS | Performed by: INTERNAL MEDICINE

## 2023-12-28 PROCEDURE — G8417 CALC BMI ABV UP PARAM F/U: HCPCS | Performed by: INTERNAL MEDICINE

## 2023-12-28 PROCEDURE — 1036F TOBACCO NON-USER: CPT | Performed by: INTERNAL MEDICINE

## 2023-12-28 PROCEDURE — 3017F COLORECTAL CA SCREEN DOC REV: CPT | Performed by: INTERNAL MEDICINE

## 2023-12-29 RX ORDER — PANTOPRAZOLE SODIUM 20 MG/1
1 TABLET, DELAYED RELEASE ORAL
COMMUNITY
Start: 2023-12-14

## 2024-01-08 ENCOUNTER — TELEPHONE (OUTPATIENT)
Dept: CARDIOLOGY | Age: 74
End: 2024-01-08

## 2024-01-08 NOTE — TELEPHONE ENCOUNTER
Left message to confirm stress test on 1/10/2024 at 0815 am. Medications and instructions reviewed. Number provided for patient to call with any questions and concerns.

## 2024-01-10 ENCOUNTER — HOSPITAL ENCOUNTER (OUTPATIENT)
Dept: CARDIOLOGY | Age: 74
Discharge: HOME | End: 2024-01-12
Attending: INTERNAL MEDICINE
Payer: MEDICARE

## 2024-01-10 VITALS
DIASTOLIC BLOOD PRESSURE: 62 MMHG | WEIGHT: 180 LBS | SYSTOLIC BLOOD PRESSURE: 118 MMHG | BODY MASS INDEX: 25.77 KG/M2 | HEIGHT: 70 IN

## 2024-01-10 VITALS
SYSTOLIC BLOOD PRESSURE: 136 MMHG | WEIGHT: 180 LBS | RESPIRATION RATE: 18 BRPM | BODY MASS INDEX: 25.77 KG/M2 | HEART RATE: 86 BPM | HEIGHT: 70 IN | DIASTOLIC BLOOD PRESSURE: 80 MMHG

## 2024-01-10 DIAGNOSIS — R06.09 DYSPNEA ON EXERTION: ICD-10-CM

## 2024-01-10 DIAGNOSIS — I49.9 VENTRICULAR ARRHYTHMIA: ICD-10-CM

## 2024-01-10 LAB
ECHO AV CUSP MM: 1.9 CM
ECHO AV MEAN GRADIENT: 3 MMHG
ECHO AV MEAN VELOCITY: 0.8 M/S
ECHO AV PEAK GRADIENT: 7 MMHG
ECHO AV PEAK VELOCITY: 1.3 M/S
ECHO AV VTI: 27.1 CM
ECHO BSA: 2.01 M2
ECHO BSA: 2.01 M2
ECHO EST RA PRESSURE: 3 MMHG
ECHO LA DIAMETER INDEX: 1.75 CM/M2
ECHO LA DIAMETER: 3.5 CM
ECHO LA VOL A-L A2C: 74 ML (ref 18–58)
ECHO LA VOL A-L A4C: 47 ML (ref 18–58)
ECHO LA VOL MOD A2C: 71 ML (ref 18–58)
ECHO LA VOL MOD A4C: 45 ML (ref 18–58)
ECHO LA VOLUME AREA LENGTH: 60 ML
ECHO LA VOLUME INDEX A-L A2C: 37 ML/M2 (ref 16–34)
ECHO LA VOLUME INDEX A-L A4C: 24 ML/M2 (ref 16–34)
ECHO LA VOLUME INDEX AREA LENGTH: 30 ML/M2 (ref 16–34)
ECHO LA VOLUME INDEX MOD A2C: 36 ML/M2 (ref 16–34)
ECHO LA VOLUME INDEX MOD A4C: 23 ML/M2 (ref 16–34)
ECHO LV FRACTIONAL SHORTENING: 36 % (ref 28–44)
ECHO LV INTERNAL DIMENSION DIASTOLE INDEX: 2.1 CM/M2
ECHO LV INTERNAL DIMENSION DIASTOLIC: 4.2 CM (ref 4.2–5.9)
ECHO LV INTERNAL DIMENSION SYSTOLIC INDEX: 1.35 CM/M2
ECHO LV INTERNAL DIMENSION SYSTOLIC: 2.7 CM
ECHO LV IVSD: 0.8 CM (ref 0.6–1)
ECHO LV MASS 2D: 101.3 G (ref 88–224)
ECHO LV MASS INDEX 2D: 50.6 G/M2 (ref 49–115)
ECHO LV POSTERIOR WALL DIASTOLIC: 0.8 CM (ref 0.6–1)
ECHO LV RELATIVE WALL THICKNESS RATIO: 0.38
ECHO MV A VELOCITY: 0.92 M/S
ECHO MV E DECELERATION TIME (DT): 256.5 MS
ECHO MV E VELOCITY: 0.66 M/S
ECHO MV E/A RATIO: 0.72
ECHO MV MAX VELOCITY: 0.9 M/S
ECHO MV MEAN GRADIENT: 1 MMHG
ECHO MV MEAN VELOCITY: 0.4 M/S
ECHO MV PEAK GRADIENT: 3 MMHG
ECHO MV VTI: 30.9 CM
ECHO RIGHT VENTRICULAR SYSTOLIC PRESSURE (RVSP): 25 MMHG
ECHO RV INTERNAL DIMENSION: 2.2 CM
ECHO TV REGURGITANT MAX VELOCITY: 2.34 M/S
ECHO TV REGURGITANT PEAK GRADIENT: 22 MMHG
NUC STRESS EJECTION FRACTION: 69 %
STRESS BASELINE DIAS BP: 80 MMHG
STRESS BASELINE HR: 95 BPM
STRESS BASELINE SYS BP: 136 MMHG
STRESS ESTIMATED WORKLOAD: 1 METS
STRESS O2 SAT REST: 95 %
STRESS PEAK DIAS BP: 80 MMHG
STRESS PEAK SYS BP: 136 MMHG
STRESS PERCENT HR ACHIEVED: 66 %
STRESS POST PEAK HR: 99 BPM
STRESS RATE PRESSURE PRODUCT: NORMAL BPM*MMHG
STRESS TARGET HR: 149 BPM
TID: 1.06

## 2024-01-10 PROCEDURE — A9502 TC99M TETROFOSMIN: HCPCS | Performed by: INTERNAL MEDICINE

## 2024-01-10 PROCEDURE — 93306 TTE W/DOPPLER COMPLETE: CPT

## 2024-01-10 PROCEDURE — 3430000000 HC RX DIAGNOSTIC RADIOPHARMACEUTICAL: Performed by: INTERNAL MEDICINE

## 2024-01-10 PROCEDURE — 93017 CV STRESS TEST TRACING ONLY: CPT

## 2024-01-10 PROCEDURE — 6360000002 HC RX W HCPCS: Performed by: INTERNAL MEDICINE

## 2024-01-10 PROCEDURE — 2580000003 HC RX 258: Performed by: INTERNAL MEDICINE

## 2024-01-10 RX ORDER — REGADENOSON 0.08 MG/ML
0.4 INJECTION, SOLUTION INTRAVENOUS
Status: COMPLETED | OUTPATIENT
Start: 2024-01-10 | End: 2024-01-10

## 2024-01-10 RX ORDER — SODIUM CHLORIDE 0.9 % (FLUSH) 0.9 %
10 SYRINGE (ML) INJECTION PRN
Status: DISCONTINUED | OUTPATIENT
Start: 2024-01-10 | End: 2024-01-13 | Stop reason: HOSPADM

## 2024-01-10 RX ADMIN — TETROFOSMIN 29.6 MILLICURIE: 0.23 INJECTION, POWDER, LYOPHILIZED, FOR SOLUTION INTRAVENOUS at 09:25

## 2024-01-10 RX ADMIN — REGADENOSON 0.4 MG: 0.08 INJECTION, SOLUTION INTRAVENOUS at 09:25

## 2024-01-10 RX ADMIN — TETROFOSMIN 8.7 MILLICURIE: 0.23 INJECTION, POWDER, LYOPHILIZED, FOR SOLUTION INTRAVENOUS at 08:12

## 2024-01-10 RX ADMIN — SODIUM CHLORIDE, PRESERVATIVE FREE 10 ML: 5 INJECTION INTRAVENOUS at 09:25

## 2024-01-10 RX ADMIN — SODIUM CHLORIDE, PRESERVATIVE FREE 10 ML: 5 INJECTION INTRAVENOUS at 08:13

## 2024-01-12 ENCOUNTER — TELEPHONE (OUTPATIENT)
Dept: CARDIOLOGY CLINIC | Age: 74
End: 2024-01-12

## 2024-01-26 DIAGNOSIS — R94.31 ABNORMAL PATIENT-ACTIVATED CARDIAC EVENT MONITOR: ICD-10-CM

## 2024-01-26 DIAGNOSIS — I47.10 SVT (SUPRAVENTRICULAR TACHYCARDIA): Primary | ICD-10-CM

## 2024-01-26 DIAGNOSIS — I47.20 VT (VENTRICULAR TACHYCARDIA) (HCC): ICD-10-CM

## 2024-01-26 DIAGNOSIS — I47.10 SVT (SUPRAVENTRICULAR TACHYCARDIA): ICD-10-CM

## 2024-01-26 DIAGNOSIS — I49.9 VENTRICULAR ARRHYTHMIA: ICD-10-CM

## 2024-01-26 DIAGNOSIS — R94.31 ABNORMAL PATIENT-ACTIVATED CARDIAC EVENT MONITOR: Primary | ICD-10-CM

## 2024-02-01 RX ORDER — METOPROLOL SUCCINATE 50 MG/1
50 TABLET, EXTENDED RELEASE ORAL 2 TIMES DAILY
Qty: 180 TABLET | Refills: 3 | Status: SHIPPED | OUTPATIENT
Start: 2024-02-01

## 2024-02-06 ENCOUNTER — TELEPHONE (OUTPATIENT)
Dept: CARDIOLOGY CLINIC | Age: 74
End: 2024-02-06

## 2024-02-06 NOTE — TELEPHONE ENCOUNTER
I HAD BEEN TALKING TO EDUARDO ABOUT HIS PROBLEMS WITH THE METOPROLOL AND DR RG WANTING TO INCREASE THE DOSAGE.  I TOLD HIM TODAY TO COME IN AND TALK TO DR RG.  THIS WAY WE CAN GET ALL QUESTIONS ANSWERED.  JANINA

## 2024-02-06 NOTE — TELEPHONE ENCOUNTER
PER DR RG  I CALLED EDUARDO AND TOLD HIM THAT HIS APPT. FOR TODAY WAS CANCELLED. PER DR RG HE NEEDS TO SEE EP.  THERE IS NOTHING ELSE HE CAN DO FOR HIM.      I CALLED EDUARDO AND GAVE HIM THE ABOVE INFORMATION.  RLL